# Patient Record
Sex: FEMALE | Race: BLACK OR AFRICAN AMERICAN | Employment: FULL TIME | URBAN - METROPOLITAN AREA
[De-identification: names, ages, dates, MRNs, and addresses within clinical notes are randomized per-mention and may not be internally consistent; named-entity substitution may affect disease eponyms.]

---

## 2019-10-17 ENCOUNTER — OFFICE VISIT (OUTPATIENT)
Dept: PRIMARY CARE CLINIC | Age: 24
End: 2019-10-17

## 2019-10-17 VITALS
SYSTOLIC BLOOD PRESSURE: 159 MMHG | TEMPERATURE: 99.2 F | OXYGEN SATURATION: 100 % | BODY MASS INDEX: 43.02 KG/M2 | WEIGHT: 252 LBS | RESPIRATION RATE: 18 BRPM | HEART RATE: 78 BPM | HEIGHT: 64 IN | DIASTOLIC BLOOD PRESSURE: 120 MMHG

## 2019-10-17 DIAGNOSIS — I10 ESSENTIAL HYPERTENSION: Primary | ICD-10-CM

## 2019-10-17 DIAGNOSIS — R06.83 SNORING: ICD-10-CM

## 2019-10-17 DIAGNOSIS — F51.01 PRIMARY INSOMNIA: ICD-10-CM

## 2019-10-17 DIAGNOSIS — E28.2 PCOS (POLYCYSTIC OVARIAN SYNDROME): ICD-10-CM

## 2019-10-17 DIAGNOSIS — E66.9 OBESITY (BMI 30-39.9): ICD-10-CM

## 2019-10-17 DIAGNOSIS — F41.9 ANXIETY: ICD-10-CM

## 2019-10-17 DIAGNOSIS — F32.A DEPRESSION, UNSPECIFIED DEPRESSION TYPE: ICD-10-CM

## 2019-10-17 RX ORDER — METFORMIN HYDROCHLORIDE 500 MG/1
TABLET ORAL 2 TIMES DAILY WITH MEALS
COMMUNITY

## 2019-10-17 RX ORDER — BUPROPION HYDROCHLORIDE 100 MG/1
100 TABLET ORAL DAILY
Qty: 30 TAB | Refills: 1 | Status: SHIPPED | OUTPATIENT
Start: 2019-10-17 | End: 2020-02-06 | Stop reason: ALTCHOICE

## 2019-10-17 RX ORDER — SERTRALINE HYDROCHLORIDE 50 MG/1
50 TABLET, FILM COATED ORAL DAILY
Qty: 30 TAB | Refills: 1 | Status: SHIPPED | OUTPATIENT
Start: 2019-10-17 | End: 2020-02-06 | Stop reason: ALTCHOICE

## 2019-10-17 RX ORDER — HYDROCHLOROTHIAZIDE 25 MG/1
25 TABLET ORAL DAILY
Qty: 90 TAB | Refills: 4 | Status: SHIPPED | OUTPATIENT
Start: 2019-10-17 | End: 2022-07-13 | Stop reason: SDUPTHER

## 2019-10-17 RX ORDER — LISINOPRIL 20 MG/1
TABLET ORAL DAILY
COMMUNITY
End: 2020-02-06 | Stop reason: SDUPTHER

## 2019-10-17 RX ORDER — HYDROCHLOROTHIAZIDE 12.5 MG/1
12.5 TABLET ORAL DAILY
COMMUNITY
End: 2019-10-17 | Stop reason: DRUGHIGH

## 2019-10-17 RX ORDER — TRAZODONE HYDROCHLORIDE 50 MG/1
50 TABLET ORAL
Qty: 30 TAB | Refills: 1 | Status: SHIPPED | OUTPATIENT
Start: 2019-10-17 | End: 2020-02-06 | Stop reason: ALTCHOICE

## 2019-10-17 RX ORDER — LISINOPRIL 20 MG/1
20 TABLET ORAL DAILY
Qty: 30 TAB | Refills: 3 | Status: SHIPPED | OUTPATIENT
Start: 2019-10-17 | End: 2020-08-18 | Stop reason: SDUPTHER

## 2019-10-17 RX ORDER — SERTRALINE HYDROCHLORIDE 50 MG/1
TABLET, FILM COATED ORAL DAILY
COMMUNITY
End: 2020-02-06 | Stop reason: ALTCHOICE

## 2019-10-17 NOTE — PROGRESS NOTES
Chief Complaint   Patient presents with   [address]     no other cncerns    Referral Request     therapist for meds    Medication Refill     1. Have you been to the ER, urgent care clinic since your last visit? Hospitalized since your last visit? No    2. Have you seen or consulted any other health care providers outside of the [address] [place] [address] since your last visit? Include any pap smears or colon screening.  No

## 2019-10-17 NOTE — PROGRESS NOTES
Mauro Mendes is a 30 y.o.  female and presents with     Chief Complaint   Patient presents with   [address]     no other cncerns    Referral Request     therapist for meds    Medication Refill    Hypertension    Obesity    Depression    PCOS     Pt is here to establish care,  Pt  Has h/o HTN, depression , PCOS, obesity, PTSD related to childhood mental and physical trauma. Pt sasys she used to go to [facility]  And was seeing primary and Psychiatrist and therapist there. She says it closed down 6  Months back and has not been able to get appt with Psych. She has been without meds for HTN for 6 months. She sees Obgyn for PCOS who put her on metformin but she cant tolerate the meds. Past Medical History:   Diagnosis Date    Depression     has been on meds since 1/11    GERD (gastroesophageal reflux disease)     Psychiatric disorder     Seasonal allergies      Past Surgical History:   Procedure Laterality Date    HX ADENOIDECTOMY      HX HEENT      jaw surgery    HX TONSILLECTOMY       Current Outpatient Medications   Medication Sig    metFORMIN (GLUCOPHAGE) 500 mg tablet Take  by mouth two (2) times daily (with meals).  sertraline (ZOLOFT) 50 mg tablet Take  by mouth daily.  lisinopril (PRINIVIL, ZESTRIL) 20 mg tablet Take  by mouth daily.  lisinopril (PRINIVIL, ZESTRIL) 20 mg tablet Take 1 Tab by mouth daily.  hydroCHLOROthiazide (HYDRODIURIL) 25 mg tablet Take 1 Tab by mouth daily.  traZODone (DESYREL) 50 mg tablet Take 1 Tab by mouth nightly.  buPROPion (WELLBUTRIN) 100 mg tablet Take 1 Tab by mouth daily.  sertraline (ZOLOFT) 50 mg tablet Take 1 Tab by mouth daily.  HYDROcodone-acetaminophen (NORCO) 5-325 mg per tablet Take 1-2 Tabs by mouth every four (4) hours as needed for Pain. Max Daily Amount: 12 Tabs.  ondansetron hcl (ZOFRAN, AS HYDROCHLORIDE,) 8 mg tablet Take 1 Tab by mouth every eight (8) hours as needed for Nausea.      No current facility-administered medications for this visit. Health Maintenance   Topic Date Due    HPV Age 9Y-34Y (1 - Female 3-dose series) 10/07/2008    DTaP/Tdap/Td series (1 - Tdap) 10/07/2014    PAP AKA CERVICAL CYTOLOGY  10/07/2014    Influenza Age 5 to Adult  08/01/2019    Pneumococcal 0-64 years  Aged Out       There is no immunization history on file for this patient. Patient's last menstrual period was 10/14/2019. Allergies and Intolerances: Allergies   Allergen Reactions    Witch Hazel Other (comments)       Family History:   Family History   Problem Relation Age of Onset    Hypertension Mother     Diabetes Mother     Heart Disease Mother     Diabetes Father     Hypertension Father     Heart Disease Father        Social History:   She  reports that she has been smoking. She has never used smokeless tobacco.  She  reports that she does not drink alcohol.             Review of Systems:   General: negative for - chills, fatigue, fever, weight change  Psych: negative for - anxiety, depression, irritability or mood swings  ENT: negative for - headaches, hearing change, nasal congestion, oral lesions, sneezing or sore throat  Heme/ Lymph: negative for - bleeding problems, bruising, pallor or swollen lymph nodes  Endo: negative for - hot flashes, polydipsia/polyuria or temperature intolerance  Resp: negative for - cough, shortness of breath or wheezing  CV: negative for - chest pain, edema or palpitations  GI: negative for - abdominal pain, change in bowel habits, constipation, diarrhea or nausea/vomiting  : negative for - dysuria, hematuria, incontinence, pelvic pain or vulvar/vaginal symptoms  MSK: negative for - joint pain, joint swelling or muscle pain  Neuro: negative for - confusion, headaches, seizures or weakness  Derm: negative for - dry skin, hair changes, rash or skin lesion changes          Physical:   Vitals:   Vitals:    10/17/19 1427   BP: (!) 159/120   Pulse: 78   Resp: 18 Temp: 99.2 °F (37.3 °C)   TempSrc: Oral   SpO2: 100%   Weight: 252 lb (114.3 kg)   Height: 5' 4\" (1.626 m)           Exam:   HEENT- atraumatic,normocephalic, awake, oriented, well nourished, obese , looks sad, talks soft  Neck - supple,no enlarged lymph nodes, no JVD, no thyromegaly  Chest- CTA, no rhonchi, no crackles  Heart- rrr, no murmurs / gallop/rub  Abdomen- soft,BS+,NT, no hepatosplenomegaly  Ext - no c/c/edema   Neuro- no focal deficits. Power 5/5 all extremities  Skin - warm,dry, no obvious rashes. Review of Data:   LABS:   Lab Results   Component Value Date/Time    WBC 8.2 08/24/2015 08:35 PM    HGB 11.8 08/24/2015 08:35 PM    HCT 37.9 08/24/2015 08:35 PM    PLATELET 120 32/32/7231 08:35 PM     Lab Results   Component Value Date/Time    Sodium 140 08/24/2015 08:35 PM    Potassium 3.8 08/24/2015 08:35 PM    Chloride 105 08/24/2015 08:35 PM    CO2 25 08/24/2015 08:35 PM    Glucose 115 (H) 08/24/2015 08:35 PM    BUN 10 08/24/2015 08:35 PM    Creatinine 0.66 08/24/2015 08:35 PM     No results found for: CHOL, CHOLX, CHLST, CHOLV, HDL, HDLP, LDL, LDLC, DLDLP, TGLX, TRIGL, TRIGP  No results found for: GPT        Impression / Plan:        ICD-10-CM ICD-9-CM    1. Essential hypertension I10 401.9 CBC WITH AUTOMATED DIFF      METABOLIC PANEL, COMPREHENSIVE      LIPID PANEL      TSH 3RD GENERATION      hydroCHLOROthiazide (HYDRODIURIL) 25 mg tablet   2. Depression, unspecified depression type F32.9 311 REFERRAL TO PSYCHIATRY      traZODone (DESYREL) 50 mg tablet      buPROPion (WELLBUTRIN) 100 mg tablet      sertraline (ZOLOFT) 50 mg tablet   3. Obesity (BMI 30-39. 9) E66.9 278.00    4. PCOS (polycystic ovarian syndrome) E28.2 256.4    5. Anxiety F41.9 300.00 REFERRAL TO PSYCHIATRY   6. Primary insomnia F51.01 307.42 REFERRAL TO PSYCHIATRY      traZODone (DESYREL) 50 mg tablet   7.  Snoring R06.83 786.09 SLEEP MEDICINE REFERRAL     Informed pt that I am not a psychiatrist . For now I am refilling her med on request. She needs to make appt with Psych and get refills from them in the future. Obesity - advised diet , exercise. Explained to patient risk benefits of the medications. Advised patient to stop meds if having any side effects. Pt verbalized understanding of the instructions. I have discussed the diagnosis with the patient and the intended plan as seen in the above orders. The patient has received an after-visit summary and questions were answered concerning future plans. I have discussed medication side effects and warnings with the patient as well. I have reviewed the plan of care with the patient, accepted their input and they are in agreement with the treatment goals. Reviewed plan of care. Patient has provided input and agrees with goals.         Raymond Schafer MD

## 2019-10-18 LAB
ALBUMIN SERPL-MCNC: 3.8 G/DL (ref 3.5–5.5)
ALBUMIN/GLOB SERPL: 1.1 {RATIO} (ref 1.2–2.2)
ALP SERPL-CCNC: 88 IU/L (ref 39–117)
ALT SERPL-CCNC: 9 IU/L (ref 0–32)
AST SERPL-CCNC: 16 IU/L (ref 0–40)
BASOPHILS # BLD AUTO: 0 X10E3/UL (ref 0–0.2)
BASOPHILS NFR BLD AUTO: 0 %
BILIRUB SERPL-MCNC: <0.2 MG/DL (ref 0–1.2)
BUN SERPL-MCNC: 13 MG/DL (ref 6–20)
BUN/CREAT SERPL: 21 (ref 9–23)
CALCIUM SERPL-MCNC: 9.1 MG/DL (ref 8.7–10.2)
CHLORIDE SERPL-SCNC: 105 MMOL/L (ref 96–106)
CHOLEST SERPL-MCNC: 162 MG/DL (ref 100–199)
CO2 SERPL-SCNC: 23 MMOL/L (ref 20–29)
CREAT SERPL-MCNC: 0.63 MG/DL (ref 0.57–1)
EOSINOPHIL # BLD AUTO: 0.1 X10E3/UL (ref 0–0.4)
EOSINOPHIL NFR BLD AUTO: 1 %
ERYTHROCYTE [DISTWIDTH] IN BLOOD BY AUTOMATED COUNT: 15.3 % (ref 12.3–15.4)
GLOBULIN SER CALC-MCNC: 3.4 G/DL (ref 1.5–4.5)
GLUCOSE SERPL-MCNC: 85 MG/DL (ref 65–99)
HCT VFR BLD AUTO: 30 % (ref 34–46.6)
HDLC SERPL-MCNC: 40 MG/DL
HGB BLD-MCNC: 9 G/DL (ref 11.1–15.9)
IMM GRANULOCYTES # BLD AUTO: 0 X10E3/UL (ref 0–0.1)
IMM GRANULOCYTES NFR BLD AUTO: 0 %
LDLC SERPL CALC-MCNC: 103 MG/DL (ref 0–99)
LYMPHOCYTES # BLD AUTO: 2.7 X10E3/UL (ref 0.7–3.1)
LYMPHOCYTES NFR BLD AUTO: 29 %
MCH RBC QN AUTO: 22.1 PG (ref 26.6–33)
MCHC RBC AUTO-ENTMCNC: 30 G/DL (ref 31.5–35.7)
MCV RBC AUTO: 74 FL (ref 79–97)
MONOCYTES # BLD AUTO: 0.6 X10E3/UL (ref 0.1–0.9)
MONOCYTES NFR BLD AUTO: 7 %
NEUTROPHILS # BLD AUTO: 5.7 X10E3/UL (ref 1.4–7)
NEUTROPHILS NFR BLD AUTO: 63 %
PLATELET # BLD AUTO: 394 X10E3/UL (ref 150–450)
POTASSIUM SERPL-SCNC: 3.5 MMOL/L (ref 3.5–5.2)
PROT SERPL-MCNC: 7.2 G/DL (ref 6–8.5)
RBC # BLD AUTO: 4.08 X10E6/UL (ref 3.77–5.28)
SODIUM SERPL-SCNC: 143 MMOL/L (ref 134–144)
TRIGL SERPL-MCNC: 95 MG/DL (ref 0–149)
TSH SERPL DL<=0.005 MIU/L-ACNC: 1.49 UIU/ML (ref 0.45–4.5)
VLDLC SERPL CALC-MCNC: 19 MG/DL (ref 5–40)
WBC # BLD AUTO: 9.2 X10E3/UL (ref 3.4–10.8)

## 2020-01-30 ENCOUNTER — OFFICE VISIT (OUTPATIENT)
Dept: SLEEP MEDICINE | Age: 25
End: 2020-01-30

## 2020-01-30 VITALS
RESPIRATION RATE: 19 BRPM | HEIGHT: 64 IN | HEART RATE: 76 BPM | TEMPERATURE: 97 F | WEIGHT: 260 LBS | BODY MASS INDEX: 44.39 KG/M2 | SYSTOLIC BLOOD PRESSURE: 150 MMHG | OXYGEN SATURATION: 97 % | DIASTOLIC BLOOD PRESSURE: 99 MMHG

## 2020-01-30 DIAGNOSIS — E66.01 OBESITY, MORBID (HCC): ICD-10-CM

## 2020-01-30 DIAGNOSIS — G47.33 OBSTRUCTIVE SLEEP APNEA (ADULT) (PEDIATRIC): Primary | ICD-10-CM

## 2020-01-30 DIAGNOSIS — I10 ESSENTIAL HYPERTENSION: ICD-10-CM

## 2020-01-30 NOTE — PROGRESS NOTES
[address] [facility] [place] [place], [address]  Tel.  [phone]  Fax. [address] [facility] [address]  [place], [address] [facility]  Tel.  [phone]  Fax. [phone] [address]   Tel.  [phone]  Fax. [phone]         Subjective:      Yanni Marion is an 30 y.o. female referred for evaluation for a sleep disorder. She complains of snoring associated with frequent night time awakenings and poor sleep quality. She also has difficulty falling asleep. Symptoms began several years ago, gradually worsening since that time. She usually can fall asleep in variable minutes. Family or house members note snoring. She denies falling asleep while during conversation. Yanni Marion does wake up frequently at night. She is bothered by waking up too early and left unable to get back to sleep. She actually sleeps about 4 hours at night and wakes up about 6 times during the night. She does not work shifts:  .   Jon Call indicates she does get too little sleep at night. Her bedtime is 0900. She awakens at  . She does not take naps. . She has the following observed behaviors: Loud snoring, Light snoring, Sleep talking, Twitching of legs or feet, Kicking with legs, Becoming very rigid and/or shaking; Nightmares. 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 [place] Sleepiness Score: 20   which reflect severe daytime drowsiness. Allergies   Allergen Reactions    Witch Hazel Other (comments)         Current Outpatient Medications:     metFORMIN (GLUCOPHAGE) 500 mg tablet, Take  by mouth two (2) times daily (with meals). , Disp: , Rfl:     lisinopril (PRINIVIL, ZESTRIL) 20 mg tablet, Take  by mouth daily. , Disp: , Rfl:     lisinopril (PRINIVIL, ZESTRIL) 20 mg tablet, Take 1 Tab by mouth daily. , Disp: 30 Tab, Rfl: 3    hydroCHLOROthiazide (HYDRODIURIL) 25 mg tablet, Take 1 Tab by mouth daily. , Disp: 90 Tab, Rfl: 4    HYDROcodone-acetaminophen (NORCO) 5-325 mg per tablet, Take 1-2 Tabs by mouth every four (4) hours as needed for Pain. Max Daily Amount: 12 Tabs., Disp: 20 Tab, Rfl: 0    ondansetron hcl (ZOFRAN, AS HYDROCHLORIDE,) 8 mg tablet, Take 1 Tab by mouth every eight (8) hours as needed for Nausea., Disp: 8 Tab, Rfl: 0    sertraline (ZOLOFT) 50 mg tablet, Take  by mouth daily. , Disp: , Rfl:     traZODone (DESYREL) 50 mg tablet, Take 1 Tab by mouth nightly., Disp: 30 Tab, Rfl: 1    buPROPion (WELLBUTRIN) 100 mg tablet, Take 1 Tab by mouth daily. , Disp: 30 Tab, Rfl: 1    sertraline (ZOLOFT) 50 mg tablet, Take 1 Tab by mouth daily. , Disp: 30 Tab, Rfl: 1     She  has a past medical history of Depression, GERD (gastroesophageal reflux disease), Psychiatric disorder, and Seasonal allergies. She  has a past surgical history that includes hx tonsillectomy; hx adenoidectomy; and hx heent. She family history includes Diabetes in her father and mother; Heart Disease in her father and mother; Hypertension in her father and mother. She  reports that she has been smoking. She has never used smokeless tobacco. She reports that she does not drink alcohol or use drugs. Review of Systems:  Constitutional:  +weight gain. Eyes:  No blurred vision. CVS:  No significant chest pain  Pulm:  No significant shortness of breath  GI:  No significant nausea or vomiting  :  No significant nocturia  Musculoskeletal:  No significant joint pain at night  Skin:  No significant rashes  Neuro:  No significant dizziness   Psych:  Treated for depression and anxiety    Sleep Review of Systems: notable for + difficulty falling asleep; +frequent awakenings at night;  regular dreaming noted; + nightmares ; +early morning headaches; + memory problems; no concentration issues; no history of any automobile or occupational accidents due to daytime drowsiness.       Objective:     Visit Vitals  BP (!) 150/99 (BP 1 Location: Left arm, BP Patient Position: Sitting)   Pulse 76   Temp 97 °F (36.1 °C) (Oral)   Resp 19   Ht 5' 4\" (1.626 m)   Wt 260 lb (117.9 kg)   SpO2 97%   BMI 44.63 kg/m²         General:   Not in acute distress   Eyes:  Anicteric sclerae, no obvious strabismus   Nose:  No obvious nasal septum deviation    Oropharynx:   Class 2 oropharyngeal outlet, thick tongue base,    Tonsils:   tonsils are absent   Neck:   Neck circ. in \"inches\": 15; midline trachea   Chest/Lungs:  Equal lung expansion, clear on auscultation    CVS:  Normal rate, regular rhythm; no JVD   Skin:  Warm to touch; no obvious rashes   Neuro:  No focal deficits ; no obvious tremor    Psych:  Normal affect,  normal countenance;          Assessment:       ICD-10-CM ICD-9-CM    1. Obstructive sleep apnea (adult) (pediatric) G47.33 327.23 SLEEP STUDY UNATTENDED, 4 CHANNEL   2. Obesity, morbid ([address] E66.01 278.01    3. Essential hypertension I10 401.9          Plan:     * The patient currently has a Moderate Risk for having sleep apnea. STOP-BANG score 4.  * PSG was ordered for initial evaluation. I have reviewed the different types of sleep studies. Attended sleep studies and home sleep apnea tests. Home sleep testing tests only for the presence and severity of sleep apnea. she understands that if the HSAT does not provide reliable result(such as poor data/failed HSAT recording), she may have to repeat the HSAT or come in for an attended polysomnogram.   * She was provided information on sleep apnea including coresponding risk factors and the importance of proper treatment. * Counseling was provided regarding proper sleep hygiene and safe driving. *   Treatment options for sleep apnea were reviewed. she is not against a trial of PAP if found to have significant sleep apnea.    The treatment plan was reviewed with the patient in detail and reviewed with the patient and the lead technologist. she understands that the lead technologist will be calling her  with the results and assisting with the next step in the treatment plan as outlined today during the consultation with me. All of her questions were addressed. 2. Obesity - I have counseled the patient regarding the benefits of weight loss. 3. Hypertension - she continues on her current regimen. she should see her doctor for optimization of blood pressure control. I have reviewed the relationship between hypertension as it relates to sleep-disordered breathing. Thank you for allowing us to participate in your patient's medical care. We'll keep you updated on these investigations.   Electronically signed by    Claudia Hanson MD  Diplomate in Sleep Medicine  [facility]

## 2020-01-30 NOTE — PATIENT INSTRUCTIONS
[address] [facility] [place]. [place], [address]  Tel.  [phone]  Fax. [phone] [facility]  [place], [address] [facility]  Tel.  [phone]  Fax. [phone] [address]  Tel.  [phone]  Fax. [phone]     Sleep Apnea: After Your Visit  Your Care Instructions  Sleep apnea occurs when you frequently stop breathing for 10 seconds or longer during sleep. It can be mild to severe, based on the number of times per hour that you stop breathing or have slowed breathing. Blocked or narrowed airways in your nose, mouth, or throat can cause sleep apnea. Your airway can become blocked when your throat muscles and tongue relax during sleep. Sleep apnea is common, occurring in 1 out of 20 individuals. Individuals having any of the following characteristics should be evaluated and treated right away due to high risk and detrimental consequences from untreated sleep apnea:  1. Obesity  2. Congestive Heart failure  3. Atrial Fibrillation  4. Uncontrolled Hypertension  5. Type II Diabetes  6. Night-time Arrhythmias  7. Stroke  8. Pulmonary Hypertension  9. High-risk Driving Populations (pilots, truck drivers, etc.)  10. Patients Considering Weight-loss Surgery    How do you know you have sleep apnea? You probably have sleep apnea if you answer 'yes' to 3 or more of the following questions:  S - Have you been told that you Snore? T - Are you often Tired during the day? O - Has anyone Observed you stop breathing while sleeping? P- Do you have (or are being treated for) high blood Pressure? B - Are you obese (Body Mass Index > 35)? A - Is your Age 48years old or older? N - Is your Neck size greater than 16 inches? G - Are you male Gender? A sleep physician can prescribe a breathing device that prevents tissues in the throat from blocking your airway.  Or your doctor may recommend using a dental device (oral breathing device) to help keep your airway open. In some cases, surgery may be needed to remove enlarged tissues in the throat. Follow-up care is a key part of your treatment and safety. Be sure to make and go to all appointments, and call your doctor if you are having problems. It's also a good idea to know your test results and keep a list of the medicines you take. How can you care for yourself at home? · Lose weight, if needed. It may reduce the number of times you stop breathing or have slowed breathing. · Go to bed at the same time every night. · Sleep on your side. It may stop mild apnea. If you tend to roll onto your back, sew a pocket in the back of your pajama top. Put a tennis ball into the pocket, and stitch the pocket shut. This will help keep you from sleeping on your back. · Avoid alcohol and medicines such as sleeping pills and sedatives before bed. · Do not smoke. Smoking can make sleep apnea worse. If you need help quitting, talk to your doctor about stop-smoking programs and medicines. These can increase your chances of quitting for good. · Prop up the head of your bed 4 to 6 inches by putting bricks under the legs of the bed. · Treat breathing problems, such as a stuffy nose, caused by a cold or allergies. · Use a continuous positive airway pressure (CPAP) breathing machine if lifestyle changes do not help your apnea and your doctor recommends it. The machine keeps your airway from closing when you sleep. · If CPAP does not help you, ask your doctor whether you should try other breathing machines. A bilevel positive airway pressure machine has two types of air pressureâone for breathing in and one for breathing out. Another device raises or lowers air pressure as needed while you breathe. · If your nose feels dry or bleeds when using one of these machines, talk with your doctor about increasing moisture in the air. A humidifier may help.   · If your nose is runny or stuffy from using a breathing machine, talk with your doctor about using decongestants or a corticosteroid nasal spray. When should you call for help? Watch closely for changes in your health, and be sure to contact your doctor if:  · You still have sleep apnea even though you have made lifestyle changes. · You are thinking of trying a device such as CPAP. · You are having problems using a CPAP or similar machine. Where can you learn more? Go to [employer]. Enter J731 in the search box to learn more about \"Sleep Apnea: After Your Visit. \"   © 6168-2827 Healthwise, [employer]. Care instructions adapted under license by Nicolas Harper (which disclaims liability or warranty for this information). This care instruction is for use with your licensed healthcare professional. If you have questions about a medical condition or this instruction, always ask your healthcare professional. Arabella Eaglel any warranty or liability for your use of this information. PROPER SLEEP HYGIENE    What to avoid  · Do not have drinks with caffeine, such as coffee or black tea, for 8 hours before bed. · Do not smoke or use other types of tobacco near bedtime. Nicotine is a stimulant and can keep you awake. · Avoid drinking alcohol late in the evening, because it can cause you to wake in the middle of the night. · Do not eat a big meal close to bedtime. If you are hungry, eat a light snack. · Do not drink a lot of water close to bedtime, because the need to urinate may wake you up during the night. · Do not read or watch TV in bed. Use the bed only for sleeping and sexual activity. What to try  · Go to bed at the same time every night, and wake up at the same time every morning. Do not take naps during the day. · Keep your bedroom quiet, dark, and cool. · Get regular exercise, but not within 3 to 4 hours of your bedtime. .  · Sleep on a comfortable pillow and mattress.   · If watching the clock makes you anxious, turn it facing away from you so you cannot see the time. · If you worry when you lie down, start a worry book. Well before bedtime, write down your worries, and then set the book and your concerns aside. · Try meditation or other relaxation techniques before you go to bed. · If you cannot fall asleep, get up and go to another room until you feel sleepy. Do something relaxing. Repeat your bedtime routine before you go to bed again. · Make your house quiet and calm about an hour before bedtime. Turn down the lights, turn off the TV, log off the computer, and turn down the volume on music. This can help you relax after a busy day. Drowsy Driving  The [address] [place] [address] Road Traffic Safety Administration cites drowsiness as a causing factor in more than 748,940 police reported crashes annually, resulting in 76,000 injuries and 1,500 deaths. Other surveys suggest 55% of people polled have driven while drowsy in the past year, 23% had fallen asleep but not crashed, 3% crashed, and 2% had and accident due to drowsy driving. Who is at risk? Young Drivers: One study of drowsy driving accidents states that 55% of the drivers were under 25 years. Of those, 75% were male. Shift Workers and Travelers: People who work overnight or travel across time zones frequently are at higher risk of experiencing Circadian Rhythm Disorders. They are trying to work and function when their body is programed to sleep. Sleep Deprived: Lack of sleep has a serious impact on your ability to pay attention or focus on a task. Consistently getting less than the average of 8 hours your body needs creates partial or cumulative sleep deprivation. Untreated Sleep Disorders: Sleep Apnea, Narcolepsy, R.L.S., and other sleep disorders (untreated) prevent a person from getting enough restful sleep. This leads to excessive daytime sleepiness and increases the risk for drowsy driving accidents by up to 7 times.   Medications / Alcohol: Even over the counter medications can cause drowsiness. Medications that impair a drivers attention should have a warning label. Alcohol naturally makes you sleepy and on its own can cause accidents. Combined with excessive drowsiness its effects are amplified. Signs of Drowsy Driving:   * You don't remember driving the last few miles   * You may drift out of your ebony   * You are unable to focus and your thoughts wander   * You may yawn more often than normal   * You have difficulty keeping your eyes open / nodding off   * Missing traffic signs, speeding, or tailgating  Prevention-   Good sleep hygiene, lifestyle and behavioral choices have the most impact on drowsy driving. There is no substitute for sleep and the average person requires 8 hours nightly. If you find yourself driving drowsy, stop and sleep. Consider the sleep hygiene tips provided during your visit as well. Medication Refill Policy: Refills for all medications require 1 week advance notice. Please have your pharmacy fax a refill request. We are unable to fax, or call in \"controled substance\" medications and you will need to pick these prescriptions up from our office. [employer] Activation    Thank you for requesting access to [employer]. Please follow the instructions below to securely access and download your online medical record. [employer] allows you to send messages to your doctor, view your test results, renew your prescriptions, schedule appointments, and more. How Do I Sign Up? 1. In your internet browser, go to [email or web address]. 2. Click on the First Time User? Click Here link in the Sign In box. You will see the New Member Sign Up page. 3. Enter your [employer] Access Code exactly as it appears below. You will not need to use this code after youve completed the sign-up process. If you do not sign up before the expiration date, you must request a new code. [employer] Access Code:  Activation code not generated  Current [employer] Status: Active (This is the date your [employer] access code will )    4. Enter the last four digits of your Social Security Number (xxxx) and Date of Birth (mm/dd/yyyy) as indicated and click Submit. You will be taken to the next sign-up page. 5. Create a [employer] ID. This will be your [employer] login ID and cannot be changed, so think of one that is secure and easy to remember. 6. Create a [employer] password. You can change your password at any time. 7. Enter your Password Reset Question and Answer. This can be used at a later time if you forget your password. 8. Enter your e-mail address. You will receive e-mail notification when new information is available in [address] 9. Click Sign Up. You can now view and download portions of your medical record. 10. Click the Download Summary menu link to download a portable copy of your medical information. Additional Information    If you have questions, please call [phone]. Remember, [employer] is NOT to be used for urgent needs. For medical emergencies, dial 911.

## 2020-01-31 ENCOUNTER — HOSPITAL ENCOUNTER (OUTPATIENT)
Dept: SLEEP MEDICINE | Age: 25
Discharge: HOME | End: 2020-01-31
Payer: MEDICAID

## 2020-01-31 PROCEDURE — 95806 SLEEP STUDY UNATT&RESP EFFT: CPT | Performed by: INTERNAL MEDICINE

## 2020-02-05 ENCOUNTER — TELEPHONE (OUTPATIENT)
Dept: SLEEP MEDICINE | Age: 25
End: 2020-02-05

## 2020-02-05 NOTE — TELEPHONE ENCOUNTER
Failed HSAT. Repeat HSAT.  Short recording time and loose sensor on finger (oxygen signal)  Inform patient and schedule

## 2020-02-06 ENCOUNTER — OFFICE VISIT (OUTPATIENT)
Dept: PRIMARY CARE CLINIC | Age: 25
End: 2020-02-06

## 2020-02-06 VITALS
HEIGHT: 64 IN | OXYGEN SATURATION: 98 % | HEART RATE: 95 BPM | TEMPERATURE: 98.2 F | RESPIRATION RATE: 20 BRPM | DIASTOLIC BLOOD PRESSURE: 100 MMHG | SYSTOLIC BLOOD PRESSURE: 146 MMHG | BODY MASS INDEX: 44.01 KG/M2 | WEIGHT: 257.8 LBS

## 2020-02-06 DIAGNOSIS — M54.9 BACK PAIN, UNSPECIFIED BACK LOCATION, UNSPECIFIED BACK PAIN LATERALITY, UNSPECIFIED CHRONICITY: ICD-10-CM

## 2020-02-06 DIAGNOSIS — R06.83 SNORING: ICD-10-CM

## 2020-02-06 DIAGNOSIS — I10 ESSENTIAL HYPERTENSION: ICD-10-CM

## 2020-02-06 DIAGNOSIS — Z87.442 HISTORY OF KIDNEY STONES: ICD-10-CM

## 2020-02-06 DIAGNOSIS — E66.9 OBESITY (BMI 30-39.9): ICD-10-CM

## 2020-02-06 DIAGNOSIS — E28.2 PCOS (POLYCYSTIC OVARIAN SYNDROME): ICD-10-CM

## 2020-02-06 DIAGNOSIS — D64.9 ANEMIA, UNSPECIFIED TYPE: ICD-10-CM

## 2020-02-06 DIAGNOSIS — R31.9 HEMATURIA, UNSPECIFIED TYPE: ICD-10-CM

## 2020-02-06 DIAGNOSIS — F32.A DEPRESSION, UNSPECIFIED DEPRESSION TYPE: ICD-10-CM

## 2020-02-06 DIAGNOSIS — R10.9 ABDOMINAL PAIN, UNSPECIFIED ABDOMINAL LOCATION: ICD-10-CM

## 2020-02-06 DIAGNOSIS — R30.0 DYSURIA: Primary | ICD-10-CM

## 2020-02-06 LAB
BILIRUB UR QL STRIP: NEGATIVE
GLUCOSE UR-MCNC: NEGATIVE MG/DL
HGB BLD-MCNC: 11.3 G/DL
KETONES P FAST UR STRIP-MCNC: NEGATIVE MG/DL
PH UR STRIP: 7 [PH] (ref 4.6–8)
PROT UR QL STRIP: NEGATIVE
SP GR UR STRIP: 1.02 (ref 1–1.03)
UA UROBILINOGEN AMB POC: NORMAL (ref 0.2–1)
URINALYSIS CLARITY POC: CLEAR
URINALYSIS COLOR POC: YELLOW
URINE BLOOD POC: NORMAL
URINE LEUKOCYTES POC: NEGATIVE
URINE NITRITES POC: NEGATIVE

## 2020-02-06 RX ORDER — VENLAFAXINE 37.5 MG/1
37.5 TABLET ORAL DAILY
COMMUNITY
End: 2020-08-18

## 2020-02-06 RX ORDER — PRAZOSIN HYDROCHLORIDE 2 MG/1
2 CAPSULE ORAL
COMMUNITY

## 2020-02-06 RX ORDER — LISINOPRIL 20 MG/1
20 TABLET ORAL 2 TIMES DAILY
Qty: 60 TAB | Refills: 3 | Status: SHIPPED | OUTPATIENT
Start: 2020-02-06 | End: 2021-05-10

## 2020-02-06 NOTE — PROGRESS NOTES
Chief Complaint   Patient presents with    Blood Pressure Check    Medication Evaluation     check dose on BP medications         1. Have you been to the ER, urgent care clinic since your last visit? Hospitalized since your last visit? No    2. Have you seen or consulted any other health care providers outside of the [address] [place] [address] since your last visit? Include any pap smears or colon screening.  No

## 2020-02-06 NOTE — PROGRESS NOTES
Aishwarya Stokes is a 30 y.o.  female and presents with     Chief Complaint   Patient presents with    Blood Pressure Check    Medication Evaluation     check dose on BP medications    Anemia    Abdominal Pain    Snoring    Depression       Pt is here for follow up. She says her blood pressure is elevated. She has been having vaginal bleeding for 6 months. She saw Obgyn and had polyps removed from uterus. However she says   She still has abdominal pain. She also has back pain. She had kidney stones in the past but says no one told her when CT was done 5 years back/  She feels tired. She sees Psych and is on new meds. She sleeps during the day and cannot sleep at night. She snores and had  Sleep study done yesterday. Past Medical History:   Diagnosis Date    Depression     has been on meds since 1/11    GERD (gastroesophageal reflux disease)     Psychiatric disorder     Seasonal allergies      Past Surgical History:   Procedure Laterality Date    HX ADENOIDECTOMY      HX HEENT      jaw surgery    HX TONSILLECTOMY       Current Outpatient Medications   Medication Sig    prazosin (MINIPRESS) 2 mg capsule Take 2 mg by mouth nightly.  venlafaxine (EFFEXOR) 37.5 mg tablet Take 37.5 mg by mouth daily.  lisinopril (PRINIVIL, ZESTRIL) 20 mg tablet Take 1 Tab by mouth two (2) times a day.  metFORMIN (GLUCOPHAGE) 500 mg tablet Take  by mouth two (2) times daily (with meals).  lisinopril (PRINIVIL, ZESTRIL) 20 mg tablet Take 1 Tab by mouth daily.  hydroCHLOROthiazide (HYDRODIURIL) 25 mg tablet Take 1 Tab by mouth daily. No current facility-administered medications for this visit.       Health Maintenance   Topic Date Due    Pneumococcal 0-64 years (1 of 1 - PPSV23) 10/07/1999    HPV Age 9Y-34Y (1 - Female 2-dose series) 10/07/2004    DTaP/Tdap/Td series (1 - Tdap) 10/07/2004    PAP AKA CERVICAL CYTOLOGY  10/07/2014    Influenza Age 9 to Adult  08/01/2019       There is no immunization history on file for this patient. No LMP recorded (lmp unknown). Allergies and Intolerances: Allergies   Allergen Reactions    Witch Hazel Other (comments)       Family History:   Family History   Problem Relation Age of Onset    Hypertension Mother     Diabetes Mother     Heart Disease Mother     Diabetes Father     Hypertension Father     Heart Disease Father        Social History:   She  reports that she has been smoking. She has never used smokeless tobacco.  She  reports no history of alcohol use.             Review of Systems:   General: negative for - chills, fatigue, fever, weight change  Psych: negative for - anxiety, depression, irritability or mood swings  ENT: negative for - headaches, hearing change, nasal congestion, oral lesions, sneezing or sore throat  Heme/ Lymph: negative for - bleeding problems, bruising, pallor or swollen lymph nodes  Endo: negative for - hot flashes, polydipsia/polyuria or temperature intolerance  Resp: negative for - cough, shortness of breath or wheezing  CV: negative for - chest pain, edema or palpitations  GI: negative for - abdominal pain, change in bowel habits, constipation, diarrhea or nausea/vomiting  : negative for - dysuria, hematuria, incontinence, pelvic pain or vulvar/vaginal symptoms  MSK: negative for - joint pain, joint swelling or muscle pain  Neuro: negative for - confusion, headaches, seizures or weakness  Derm: negative for - dry skin, hair changes, rash or skin lesion changes          Physical:   Vitals:   Vitals:    02/06/20 1319 02/06/20 1416   BP: (!) 151/113 (!) 146/100   Pulse: 95    Resp: 20    Temp: 98.2 °F (36.8 °C)    TempSrc: Oral    SpO2: 98%    Weight: 257 lb 12.8 oz (116.9 kg)    Height: 5' 4\" (1.626 m)            Exam:   HEENT- atraumatic,normocephalic, awake, oriented, well nourished, seems to be in pain and also slightly sleepy  Neck - supple,no enlarged lymph nodes, no JVD, no thyromegaly  Chest- CTA, no rhonchi, no crackles  Heart- rrr, no murmurs / gallop/rub  Abdomen- soft,BS+,NT, no hepatosplenomegaly,mild abdominal tenderness diffuse  Ext - no c/c/edema   Neuro- no focal deficits. Power 5/5 all extremities  Skin - warm,dry, no obvious rashes. Review of Data:   LABS:   Lab Results   Component Value Date/Time    WBC 9.2 10/17/2019 03:24 PM    HGB 9.0 (L) 10/17/2019 03:24 PM    HCT 30.0 (L) 10/17/2019 03:24 PM    PLATELET 383 24/15/1247 03:24 PM     Lab Results   Component Value Date/Time    Sodium 143 10/17/2019 03:24 PM    Potassium 3.5 10/17/2019 03:24 PM    Chloride 105 10/17/2019 03:24 PM    CO2 23 10/17/2019 03:24 PM    Glucose 85 10/17/2019 03:24 PM    BUN 13 10/17/2019 03:24 PM    Creatinine 0.63 10/17/2019 03:24 PM     Lab Results   Component Value Date/Time    Cholesterol, total 162 10/17/2019 03:24 PM    HDL Cholesterol 40 10/17/2019 03:24 PM    LDL, calculated 103 (H) 10/17/2019 03:24 PM    Triglyceride 95 10/17/2019 03:24 PM     No results found for: GPT        Impression / Plan:        ICD-10-CM ICD-9-CM    1. Dysuria R30.0 788.1 AMB POC URINALYSIS DIP STICK AUTO W/O MICRO   2. Depression, unspecified depression type F32.9 311    3. Essential hypertension I10 401.9 lisinopril (PRINIVIL, ZESTRIL) 20 mg tablet   4. Obesity (BMI 30-39. 9) E66.9 278.00    5. PCOS (polycystic ovarian syndrome) E28.2 256.4    6. Snoring R06.83 786.09    7. Anemia, unspecified type D64.9 285.9 AMB POC HEMOGLOBIN (HGB)   8. History of kidney stones Z87.442 V13.01 CT ABD W CONT   9. Hematuria, unspecified type R31.9 599.70 CT ABD W CONT   10. Abdominal pain, unspecified abdominal location R10.9 789.00 CT ABD W CONT   11. Back pain, unspecified back location, unspecified back pain laterality, unspecified chronicity M54.9 724.5 CT ABD W CONT       HTN -  increase lisinopril to bid, low salt diet. Anemia - improved    Uterine polyp , vaginal bleeding - follow up with Obgyn.         Explained to patient risk benefits of the medications. Advised patient to stop meds if having any side effects. Pt verbalized understanding of the instructions. I have discussed the diagnosis with the patient and the intended plan as seen in the above orders. The patient has received an after-visit summary and questions were answered concerning future plans. I have discussed medication side effects and warnings with the patient as well. I have reviewed the plan of care with the patient, accepted their input and they are in agreement with the treatment goals. Reviewed plan of care. Patient has provided input and agrees with goals. Follow-up and Dispositions    · Return in about 3 months (around 5/6/2020).          Priscila Andrea MD

## 2020-02-10 ENCOUNTER — HOSPITAL ENCOUNTER (OUTPATIENT)
Dept: CT IMAGING | Age: 25
Discharge: HOME | End: 2020-02-10
Attending: INTERNAL MEDICINE
Payer: MEDICAID

## 2020-02-10 ENCOUNTER — TELEPHONE (OUTPATIENT)
Dept: PRIMARY CARE CLINIC | Age: 25
End: 2020-02-10

## 2020-02-10 DIAGNOSIS — M54.9 BACK PAIN, UNSPECIFIED BACK LOCATION, UNSPECIFIED BACK PAIN LATERALITY, UNSPECIFIED CHRONICITY: ICD-10-CM

## 2020-02-10 DIAGNOSIS — R10.9 ABDOMINAL PAIN, UNSPECIFIED ABDOMINAL LOCATION: Primary | ICD-10-CM

## 2020-02-10 DIAGNOSIS — E28.2 PCOS (POLYCYSTIC OVARIAN SYNDROME): ICD-10-CM

## 2020-02-10 DIAGNOSIS — Z87.442 HISTORY OF KIDNEY STONES: ICD-10-CM

## 2020-02-10 DIAGNOSIS — R31.9 HEMATURIA, UNSPECIFIED TYPE: ICD-10-CM

## 2020-02-10 DIAGNOSIS — R10.9 ABDOMINAL PAIN, UNSPECIFIED ABDOMINAL LOCATION: ICD-10-CM

## 2020-02-10 PROCEDURE — 74176 CT ABD & PELVIS W/O CONTRAST: CPT

## 2020-02-10 RX ORDER — SODIUM CHLORIDE 0.9 % (FLUSH) 0.9 %
10 SYRINGE (ML) INJECTION
Status: DISCONTINUED | OUTPATIENT
Start: 2020-02-10 | End: 2020-02-10 | Stop reason: ALTCHOICE

## 2020-02-10 NOTE — TELEPHONE ENCOUNTER
Cornelius Mcduffie from [facility] scan is calling and needs a new order sent over for an abdomin/pelvic CAT scan asap

## 2020-02-11 ENCOUNTER — TELEPHONE (OUTPATIENT)
Dept: PRIMARY CARE CLINIC | Age: 25
End: 2020-02-11

## 2020-02-11 DIAGNOSIS — N20.0 KIDNEY STONES: Primary | ICD-10-CM

## 2020-02-11 NOTE — TELEPHONE ENCOUNTER
Patient has requested a copy of CT results to be sent over to [facility] GYN office, Dr. Keysha Womack. [phone] fax number. Copy has been faxed with confirmation.

## 2020-02-11 NOTE — TELEPHONE ENCOUNTER
Message has been conveyed to patient per Dr. Judah Zayas. Patient verbalized understanding and no further questions were asked.

## 2020-02-11 NOTE — TELEPHONE ENCOUNTER
Pt called and would like test results/cat scan sent to her other Dr.    Dr. Priscilla Wesley  Fax: [phone]

## 2020-02-11 NOTE — TELEPHONE ENCOUNTER
Patient has attempted to provide two fax numbers for Dr. Alfredito Sims. Error confirmation came with both fax numbers.  Patient has decided to have paper work mailed to home address

## 2020-02-11 NOTE — TELEPHONE ENCOUNTER
Pt called back to inform the provider that the urologist referred by him is out of network. She was asking to know which urologist is in network with her insurance. And informed the pt to call her insurance and find out who is in network with her insurance and inform the name of the doctor, address and fax number so that the reference can be faxed over.

## 2020-02-11 NOTE — TELEPHONE ENCOUNTER
----- Message from Julisa Jimenez MD sent at 2/11/2020  9:02 AM EST -----  Ct scan shows several kidney stones. I am referring pt to Urology.

## 2020-02-13 ENCOUNTER — TELEPHONE (OUTPATIENT)
Dept: PRIMARY CARE CLINIC | Age: 25
End: 2020-02-13

## 2020-02-13 NOTE — TELEPHONE ENCOUNTER
Last office visit and last labs have been faxed to Beula Na with Urology as requested with fax confirmation

## 2020-02-13 NOTE — TELEPHONE ENCOUNTER
Claudio Osorio from Urology says she has an appointment today and they have yet to receive any notes or labs from us and they need them before her appt at 10am.    Please call back or fax information.     Fax:[phone]

## 2020-08-18 ENCOUNTER — VIRTUAL VISIT (OUTPATIENT)
Dept: PRIMARY CARE CLINIC | Age: 25
End: 2020-08-18
Payer: MEDICAID

## 2020-08-18 DIAGNOSIS — I10 ESSENTIAL HYPERTENSION: ICD-10-CM

## 2020-08-18 DIAGNOSIS — R06.83 SNORING: ICD-10-CM

## 2020-08-18 DIAGNOSIS — G43.009 MIGRAINE WITHOUT AURA AND WITHOUT STATUS MIGRAINOSUS, NOT INTRACTABLE: ICD-10-CM

## 2020-08-18 DIAGNOSIS — F32.A DEPRESSION, UNSPECIFIED DEPRESSION TYPE: Primary | ICD-10-CM

## 2020-08-18 DIAGNOSIS — F41.9 ANXIETY: ICD-10-CM

## 2020-08-18 PROCEDURE — 99213 OFFICE O/P EST LOW 20 MIN: CPT | Performed by: INTERNAL MEDICINE

## 2020-08-18 RX ORDER — LISINOPRIL 20 MG/1
20 TABLET ORAL 2 TIMES DAILY
Qty: 60 TAB | Refills: 3 | Status: SHIPPED | OUTPATIENT
Start: 2020-08-18 | End: 2021-01-20 | Stop reason: SDUPTHER

## 2020-08-18 RX ORDER — LORAZEPAM 0.5 MG/1
0.5 TABLET ORAL
Qty: 20 TAB | Refills: 0 | Status: SHIPPED | OUTPATIENT
Start: 2020-08-18 | End: 2022-07-13

## 2020-08-18 RX ORDER — PROPRANOLOL HYDROCHLORIDE 20 MG/1
20 TABLET ORAL 2 TIMES DAILY
Qty: 60 TAB | Refills: 3 | Status: SHIPPED | OUTPATIENT
Start: 2020-08-18 | End: 2021-01-11

## 2020-08-18 RX ORDER — SERTRALINE HYDROCHLORIDE 50 MG/1
50 TABLET, FILM COATED ORAL DAILY
Qty: 30 TAB | Refills: 3 | Status: SHIPPED | OUTPATIENT
Start: 2020-08-18 | End: 2021-01-11

## 2020-08-18 NOTE — PROGRESS NOTES
Maribell Mcgowan is a 30 y.o. female who was seen by synchronous (real-time) audio-video technology on 8/18/2020 for Hypertension        Assessment & Plan:   Diagnoses and all orders for this visit:    1. Depression, unspecified depression type    2. Essential hypertension  -     lisinopriL (PRINIVIL, ZESTRIL) 20 mg tablet; Take 1 Tab by mouth two (2) times a day. -     propranoloL (INDERAL) 20 mg tablet; Take 1 Tab by mouth two (2) times a day. 3. Anxiety  -     sertraline (ZOLOFT) 50 mg tablet; Take 1 Tab by mouth daily.  -     LORazepam (ATIVAN) 0.5 mg tablet; Take 1 Tab by mouth nightly as needed for Anxiety. Max Daily Amount: 0.5 mg.    4. Snoring  -     SLEEP MEDICINE REFERRAL    5. Migraine without aura and without status migrainosus, not intractable  -     REFERRAL TO NEUROLOGY  -     propranoloL (INDERAL) 20 mg tablet; Take 1 Tab by mouth two (2) times a day. Information patient that we will try Inderal that might help with both high blood pressure as well as migraine headaches. Asked patient to make appointment with psychiatrist    I spent at least 15 minutes on this visit with this established patient. Subjective:       Prior to Admission medications    Medication Sig Start Date End Date Taking? Authorizing Provider   prazosin (MINIPRESS) 2 mg capsule Take 2 mg by mouth nightly. Provider, Historical   venlafaxine (EFFEXOR) 37.5 mg tablet Take 37.5 mg by mouth daily. Provider, Historical   lisinopril (PRINIVIL, ZESTRIL) 20 mg tablet Take 1 Tab by mouth two (2) times a day. 2/6/20   Stephan Bob MD   metFORMIN (GLUCOPHAGE) 500 mg tablet Take  by mouth two (2) times daily (with meals). Provider, Historical   lisinopril (PRINIVIL, ZESTRIL) 20 mg tablet Take 1 Tab by mouth daily. 10/17/19   Stephan Bob MD   hydroCHLOROthiazide (HYDRODIURIL) 25 mg tablet Take 1 Tab by mouth daily. 10/17/19   Stephan Bob MD     History    Pt was in hospital for panic attack.   Pt was at [facility]. Pt sees Psych[facility]ard [facility] at [facility]. Pt says that the medicines that was given to her by Psych are not working  Pt was given ativan in the hospital.  Pt had surgery in February for cyst on he ovaries. She reports that she is taking her blood pressure medications and needs refills. She does snore and has not had a sleep study. She also reports of migraine headaches. ROS    Objective:   No flowsheet data found. [INSTRUCTIONS:  \"[x]\" Indicates a positive item  \"[]\" Indicates a negative item  -- DELETE ALL ITEMS NOT EXAMINED]    Constitutional: [x] Appears well-developed and well-nourished [x] No apparent distress      [] Abnormal -     Mental status: [x] Alert and awake  [x] Oriented to person/place/time [x] Able to follow commands    [] Abnormal -     Eyes:   EOM    [x]  Normal    [] Abnormal -   Sclera  [x]  Normal    [] Abnormal -          Discharge [x]  None visible   [] Abnormal -     HENT: [x] Normocephalic, atraumatic  [] Abnormal -   [x] Mouth/Throat: Mucous membranes are moist    External Ears [x] Normal  [] Abnormal -    Neck: [x] No visualized mass [] Abnormal -     Pulmonary/Chest: [x] Respiratory effort normal   [x] No visualized signs of difficulty breathing or respiratory distress        [] Abnormal -      Musculoskeletal:   [x] Normal gait with no signs of ataxia         [x] Normal range of motion of neck        [] Abnormal -     Neurological:        [x] No Facial Asymmetry (Cranial nerve 7 motor function) (limited exam due to video visit)          [x] No gaze palsy        [] Abnormal -          Skin:        [x] No significant exanthematous lesions or discoloration noted on facial skin         [] Abnormal -            Psychiatric:       [x] Normal Affect [] Abnormal -        [x] No Hallucinations    Other pertinent observable physical exam findings:-        We discussed the expected course, resolution and complications of the diagnosis(es) in detail.   Medication risks, benefits, costs, interactions, and alternatives were discussed as indicated. I advised her to contact the office if her condition worsens, changes or fails to improve as anticipated. She expressed understanding with the diagnosis(es) and plan. Rudy Fernández, who was evaluated through a patient-initiated, synchronous (real-time) audio-video encounter, and/or her healthcare decision maker, is aware that it is a billable service, with coverage as determined by her insurance carrier. She provided verbal consent to proceed: Yes, and patient identification was verified. It was conducted pursuant to the emergency declaration under the [address] [place] [address], [address] [place] [address] authority and the [employer] Act. A caregiver was present when appropriate. Ability to conduct physical exam was limited. I was at home. The patient was at home.       Miriam Rodríguez MD

## 2020-08-27 ENCOUNTER — DOCUMENTATION ONLY (OUTPATIENT)
Dept: NEUROLOGY | Facility: CLINIC | Age: 25
End: 2020-08-27

## 2020-08-27 ENCOUNTER — OFFICE VISIT (OUTPATIENT)
Dept: NEUROLOGY | Facility: CLINIC | Age: 25
End: 2020-08-27
Payer: MEDICAID

## 2020-08-27 VITALS
DIASTOLIC BLOOD PRESSURE: 90 MMHG | BODY MASS INDEX: 43.87 KG/M2 | SYSTOLIC BLOOD PRESSURE: 150 MMHG | HEART RATE: 74 BPM | RESPIRATION RATE: 16 BRPM | WEIGHT: 257 LBS | HEIGHT: 64 IN | OXYGEN SATURATION: 98 %

## 2020-08-27 DIAGNOSIS — T39.95XA ANALGESIC OVERUSE HEADACHE: ICD-10-CM

## 2020-08-27 DIAGNOSIS — G43.709 CHRONIC MIGRAINE W/O AURA W/O STATUS MIGRAINOSUS, NOT INTRACTABLE: Primary | ICD-10-CM

## 2020-08-27 DIAGNOSIS — G44.40 ANALGESIC OVERUSE HEADACHE: ICD-10-CM

## 2020-08-27 PROCEDURE — 99204 OFFICE O/P NEW MOD 45 MIN: CPT | Performed by: PSYCHIATRY & NEUROLOGY

## 2020-08-27 RX ORDER — SUMATRIPTAN 50 MG/1
50 TABLET, FILM COATED ORAL
Qty: 9 TAB | Refills: 1 | Status: SHIPPED | OUTPATIENT
Start: 2020-08-27 | End: 2020-08-27

## 2020-08-27 RX ORDER — DEXAMETHASONE 1 MG/1
TABLET ORAL
Qty: 18 TAB | Refills: 0 | Status: SHIPPED | OUTPATIENT
Start: 2020-08-27 | End: 2022-07-13

## 2020-08-27 RX ORDER — ERENUMAB-AOOE 140 MG/ML
140 INJECTION, SOLUTION SUBCUTANEOUS ONCE
Qty: 1 EACH | Refills: 0 | Status: SHIPPED | COMMUNITY
Start: 2020-08-27 | End: 2020-08-27

## 2020-08-27 RX ORDER — OXYCODONE AND ACETAMINOPHEN 5; 325 MG/1; MG/1
TABLET ORAL
COMMUNITY
End: 2022-07-13

## 2020-08-27 RX ORDER — ERENUMAB-AOOE 70 MG/ML
70 INJECTION SUBCUTANEOUS
Qty: 1 EACH | Refills: 3 | Status: SHIPPED | OUTPATIENT
Start: 2020-08-27 | End: 2020-08-28 | Stop reason: ALTCHOICE

## 2020-08-27 RX ORDER — VENLAFAXINE 25 MG/1
TABLET ORAL
COMMUNITY
End: 2021-01-11

## 2020-08-27 NOTE — PATIENT INSTRUCTIONS
PRESCRIPTION REFILL POLICY [facility] Statement to Patients April 1, 2014 In an effort to ensure the large volume of patient prescription refills is processed in the most efficient and expeditious manner, we are asking our patients to assist us by calling your Pharmacy for all prescription refills, this will include also your  Mail Order Pharmacy. The pharmacy will contact our office electronically to continue the refill process. Please do not wait until the last minute to call your pharmacy. We need at least 48 hours (2days) to fill prescriptions. We also encourage you to call your pharmacy before going to  your prescription to make sure it is ready. With regard to controlled substance prescription refill requests (narcotic refills) that need to be picked up at our office, we ask your cooperation by providing us with at least 72 hours (3days) notice that you will need a refill. We will not refill narcotic prescription refill requests after 4:00pm on any weekday, Monday through Thursday, or after 2:00pm on Fridays, or on the weekends. We encourage everyone to explore another way of getting your prescription refill request processed using [employer], our patient web portal through our electronic medical record system. [employer] is an efficient and effective way to communicate your medication request directly to the office and  downloadable as an daryl on your smart phone . [employer] also features a review functionality that allows you to view your medication list as well as leave messages for your physician. Are you ready to get connected? If so please review the attatched instructions or speak to any of our staff to get you set up right away! Thank you so much for your cooperation. Should you have any questions please contact our Practice Administrator. The Physicians and Staff,  [facility]

## 2020-08-27 NOTE — PROGRESS NOTES
Ms. Smith [facility] presents as a new patient for evaluation of migraines. Patient reported a daily migraine for the past several years. Depression screening done on patient.

## 2020-08-27 NOTE — PROGRESS NOTES
Chief Complaint   Patient presents with    Migraine         HISTORY OF PRESENT ILLNESS  Rocio Francis is a 30 y.o. female who came in for neurological consultation requested by Dr. Jayda Whitley. She has had headaches since 4years of age. She continues to get them very frequently and has a headache almost daily. She describes different types of headaches. She always keeps a mild dull headache in the background. At times it will become pulsating or throbbing, in the vertex region associated with light sensitivity and noise sensitivity. Occasionally she will feel nauseous. She would prefer to go into a dark room when this happens. She has been using ibuprofen almost daily for the past several months. She cannot think of any triggers. There are no other focal motor or sensory deficits associated with the headaches. She has been on amitriptyline and nortriptyline and could not tolerate those. She is currently on propanolol. Also takes Effexor and venlafaxine. Does report a lot of stress in her personal life related to death of her grandmother recently and the ongoing pandemic. Currently not employed. Past Medical History:   Diagnosis Date    Depression     has been on meds since 1/11    GERD (gastroesophageal reflux disease)     Psychiatric disorder     Seasonal allergies      Current Outpatient Medications   Medication Sig    venlafaxine (EFFEXOR) 25 mg tablet Take  by mouth.  oxyCODONE-acetaminophen (PERCOCET) 5-325 mg per tablet Take  by mouth every four (4) hours as needed for Pain.  SUMAtriptan (IMITREX) 50 mg tablet Take 1 Tab by mouth once as needed for Migraine for up to 1 dose.  erenumab-aooe (Aimovig Autoinjector) 140 mg/mL injection 1 mL by SubCUTAneous route once for 1 dose.  erenumab-aooe (Aimovig Autoinjector) 70 mg/mL injection 1 mL by SubCUTAneous route every thirty (30) days.     dexAMETHasone (DECADRON) 1 mg tablet TAKE 1 TABLET THREE TIMES A DAY FOR 3 DAYS, THEN 1 TABLET TWO TIMES A DAY FOR 3 DAYS, THEN 1 TABLET ONCE A DAY FOR 3 DAYS AND THEN STOP    lisinopriL (PRINIVIL, ZESTRIL) 20 mg tablet Take 1 Tab by mouth two (2) times a day.  sertraline (ZOLOFT) 50 mg tablet Take 1 Tab by mouth daily.  LORazepam (ATIVAN) 0.5 mg tablet Take 1 Tab by mouth nightly as needed for Anxiety. Max Daily Amount: 0.5 mg.  propranoloL (INDERAL) 20 mg tablet Take 1 Tab by mouth two (2) times a day.  prazosin (MINIPRESS) 2 mg capsule Take 2 mg by mouth nightly.  lisinopril (PRINIVIL, ZESTRIL) 20 mg tablet Take 1 Tab by mouth two (2) times a day.  metFORMIN (GLUCOPHAGE) 500 mg tablet Take  by mouth two (2) times daily (with meals).  hydroCHLOROthiazide (HYDRODIURIL) 25 mg tablet Take 1 Tab by mouth daily. No current facility-administered medications for this visit.       Allergies   Allergen Reactions    Witch Hazel Other (comments)     Family History   Problem Relation Age of Onset    Hypertension Mother     Diabetes Mother     Heart Disease Mother     Diabetes Father     Hypertension Father     Heart Disease Father      Social History     Tobacco Use    Smoking status: Current Every Day Smoker    Smokeless tobacco: Never Used    Tobacco comment: black   Substance Use Topics    Alcohol use: No    Drug use: No     Past Surgical History:   Procedure Laterality Date    HX ADENOIDECTOMY      HX HEENT      jaw surgery    HX TONSILLECTOMY           REVIEW OF SYSTEMS  Review of Systems - History obtained from the patient  Psychological ROS: negative  ENT ROS: negative  Hematological and Lymphatic ROS: negative  Endocrine ROS: negative  Respiratory ROS: no cough, shortness of breath, or wheezing  Cardiovascular ROS: no chest pain or dyspnea on exertion  Gastrointestinal ROS: no abdominal pain, change in bowel habits, or black or bloody stools  Genito-Urinary ROS: no dysuria, trouble voiding, or hematuria  Musculoskeletal ROS: negative  Dermatological ROS: negative      PHYSICAL EXAMINATION:    Visit Vitals  /90   Pulse 74   Resp 16   Ht 5' 4\" (1.626 m)   Wt 116.6 kg (257 lb)   SpO2 98%   BMI 44.11 kg/m²     General:  Well nourished and groomed individual in no acute distress. Neck: Supple, nontender, no bruits, no pain with resistance to active range of motion. Heart: Regular rate and rhythm. Normal S1S2. Lungs:  Equal chest expansion, no cough, no wheeze  Musculoskeletal:  Extremities revealed no edema and had full range of motion of joints. Psych:  Good mood and bright affect    NEUROLOGICAL EXAMINATION:     Mental Status:   Alert and oriented to person, place, and time with recent and remote memory intact. Attention span and concentration are normal. Speech is fluent. Cranial Nerves:    II, III, IV, VI:  Visual acuity grossly intact. Visual fields are normal.    Pupils are equal, round, and reactive to light and accommodation. Extra-ocular movements are full and fluid. Fundoscopic exam was benign, no ptosis or nystagmus. V-XII: Hearing is grossly intact. Facial features are symmetric, with normal sensation and strength. The palate rises symmetrically and the tongue protrudes midline. Sternocleidomastoids 5/5. Motor Examination: Normal tone, bulk, and strength. 5/5 muscle strength throughout. No cogwheel rigidity or clonus present. Sensory exam:  Normal throughout to pinprick, temperature, and vibration sense. Normal proprioception. Coordination:  Finger to nose and rapid arm movement testing was normal.   No resting or intention tremor    Gait and Station:  Steady while walking on toes, heels, and with tandem walking. Normal arm swing. No Rhomberg or pronator drift. No muscle wasting or fasiculations noted. Reflexes:  DTRs 2+ throughout. Toes downgoing.         LABS / IMAGING  Lab Results   Component Value Date/Time    WBC 9.2 10/17/2019 03:24 PM    Hemoglobin (POC) 11.3 02/06/2020 01:55 PM    HGB 9.0 (L) 10/17/2019 03:24 PM    HCT 30.0 (L) 10/17/2019 03:24 PM    PLATELET 591 19/57/1828 03:24 PM    MCV 74 (L) 10/17/2019 03:24 PM     Lab Results   Component Value Date/Time    Sodium 143 10/17/2019 03:24 PM    Potassium 3.5 10/17/2019 03:24 PM    Chloride 105 10/17/2019 03:24 PM    CO2 23 10/17/2019 03:24 PM    Anion gap 10 08/24/2015 08:35 PM    Glucose 85 10/17/2019 03:24 PM    BUN 13 10/17/2019 03:24 PM    Creatinine 0.63 10/17/2019 03:24 PM    BUN/Creatinine ratio 21 10/17/2019 03:24 PM    GFR est  10/17/2019 03:24 PM    GFR est non- 10/17/2019 03:24 PM    Calcium 9.1 10/17/2019 03:24 PM    Bilirubin, total <0.2 10/17/2019 03:24 PM    Alk. phosphatase 88 10/17/2019 03:24 PM    Protein, total 7.2 10/17/2019 03:24 PM    Albumin 3.8 10/17/2019 03:24 PM    Globulin 5.3 (H) 08/24/2015 08:35 PM    A-G Ratio 1.1 (L) 10/17/2019 03:24 PM    ALT (SGPT) 9 10/17/2019 03:24 PM    AST (SGOT) 16 10/17/2019 03:24 PM       ASSESSMENT    ICD-10-CM ICD-9-CM    1. Chronic migraine w/o aura w/o status migrainosus, not intractable  G43.709 346.70 SUMAtriptan (IMITREX) 50 mg tablet      erenumab-aooe (Aimovig Autoinjector) 140 mg/mL injection      erenumab-aooe (Aimovig Autoinjector) 70 mg/mL injection   2. Analgesic overuse headache  G44.40 339.3 dexAMETHasone (DECADRON) 1 mg tablet    T39.95XA E935.9        DISCUSSION  Ms. Maribell Mcgowan has chronic migraines, chronic daily headaches with analgesic overuse/rebound component  We discussed analgesic induced HA and the use of analgesics more than 2-3 times weekly will do nothing but drive the HA and to break this cycle all analgesics, whether OTC or prescribed have to be discontinued and HA likely to get worse before they get better. A short course of steroid was also given.   Since she has been on multiple prophylactics including amitriptyline, propranolol, venlafaxine without benefit, we will start her on Aimovig  Loading dose was given in the office today and she will start 70 mg monthly injection  Use sumatriptan 50 mg as needed for abortive therapy  Continue periodic follow-up    Thank you for allowing me to participate in the care of Ms. Bazan. Please feel free to contact me if you have any questions. I will be happy to follow to follow her along with you. Marlene Bajwa MD  Diplomate, American Board of Psychiatry & Neurology (Neurology)  [facility] Board of Psychiatry & Neurology (Clinical Neurophysiology)  Diplomate, American Board of Electrodiagnostic Medicine    This note will not be viewable in [address]

## 2020-08-28 ENCOUNTER — TELEPHONE (OUTPATIENT)
Dept: NEUROLOGY | Facility: CLINIC | Age: 25
End: 2020-08-28

## 2020-08-28 DIAGNOSIS — G43.709 CHRONIC MIGRAINE W/O AURA W/O STATUS MIGRAINOSUS, NOT INTRACTABLE: Primary | ICD-10-CM

## 2020-08-28 RX ORDER — GALCANEZUMAB 120 MG/ML
120 INJECTION, SOLUTION SUBCUTANEOUS
Qty: 1 SYRINGE | Refills: 3 | Status: SHIPPED | OUTPATIENT
Start: 2020-08-28 | End: 2021-02-19 | Stop reason: ALTCHOICE

## 2020-08-28 NOTE — TELEPHONE ENCOUNTER
Re: Triny Berry denied     Denial rec'd from [employer] via [facility]    Per plan: patient must have a trial of emgality and pregnancy test at baseline. Patient has medicaid and is not eligible to utilize co-pay savings card.

## 2020-09-03 ENCOUNTER — HOSPITAL ENCOUNTER (EMERGENCY)
Age: 25
Discharge: HOME | End: 2020-09-03
Attending: EMERGENCY MEDICINE | Admitting: EMERGENCY MEDICINE
Payer: MEDICAID

## 2020-09-03 ENCOUNTER — APPOINTMENT (OUTPATIENT)
Dept: CT IMAGING | Age: 25
End: 2020-09-03
Attending: EMERGENCY MEDICINE
Payer: MEDICAID

## 2020-09-03 VITALS
DIASTOLIC BLOOD PRESSURE: 96 MMHG | OXYGEN SATURATION: 100 % | RESPIRATION RATE: 18 BRPM | TEMPERATURE: 97.6 F | SYSTOLIC BLOOD PRESSURE: 176 MMHG | HEART RATE: 67 BPM

## 2020-09-03 DIAGNOSIS — R10.2 PELVIC PAIN: Primary | ICD-10-CM

## 2020-09-03 LAB
ALBUMIN SERPL-MCNC: 3.3 G/DL (ref 3.5–5)
ALBUMIN/GLOB SERPL: 0.6 {RATIO} (ref 1.1–2.2)
ALP SERPL-CCNC: 120 U/L (ref 45–117)
ALT SERPL-CCNC: 16 U/L (ref 12–78)
ANION GAP SERPL CALC-SCNC: 7 MMOL/L (ref 5–15)
APPEARANCE UR: CLEAR
AST SERPL-CCNC: 17 U/L (ref 15–37)
BACTERIA URNS QL MICRO: NEGATIVE /HPF
BASOPHILS # BLD: 0 K/UL (ref 0–0.1)
BASOPHILS NFR BLD: 0 % (ref 0–1)
BILIRUB SERPL-MCNC: 0.3 MG/DL (ref 0.2–1)
BILIRUB UR QL: NEGATIVE
BUN SERPL-MCNC: 12 MG/DL (ref 6–20)
BUN/CREAT SERPL: 16 (ref 12–20)
CALCIUM SERPL-MCNC: 9.3 MG/DL (ref 8.5–10.1)
CHLORIDE SERPL-SCNC: 105 MMOL/L (ref 97–108)
CLUE CELLS VAG QL WET PREP: NORMAL
CO2 SERPL-SCNC: 26 MMOL/L (ref 21–32)
COLOR UR: ABNORMAL
COMMENT, HOLDF: NORMAL
CREAT SERPL-MCNC: 0.76 MG/DL (ref 0.55–1.02)
DIFFERENTIAL METHOD BLD: ABNORMAL
EOSINOPHIL # BLD: 0.1 K/UL (ref 0–0.4)
EOSINOPHIL NFR BLD: 1 % (ref 0–7)
EPITH CASTS URNS QL MICRO: ABNORMAL /LPF
ERYTHROCYTE [DISTWIDTH] IN BLOOD BY AUTOMATED COUNT: 16.7 % (ref 11.5–14.5)
GLOBULIN SER CALC-MCNC: 5.1 G/DL (ref 2–4)
GLUCOSE SERPL-MCNC: 96 MG/DL (ref 65–100)
GLUCOSE UR STRIP.AUTO-MCNC: NEGATIVE MG/DL
HCG UR QL: NEGATIVE
HCT VFR BLD AUTO: 35.1 % (ref 35–47)
HGB BLD-MCNC: 10.6 G/DL (ref 11.5–16)
HGB UR QL STRIP: NEGATIVE
HYALINE CASTS URNS QL MICRO: ABNORMAL /LPF (ref 0–5)
IMM GRANULOCYTES # BLD AUTO: 0 K/UL (ref 0–0.04)
IMM GRANULOCYTES NFR BLD AUTO: 0 % (ref 0–0.5)
KETONES UR QL STRIP.AUTO: NEGATIVE MG/DL
LEUKOCYTE ESTERASE UR QL STRIP.AUTO: NEGATIVE
LYMPHOCYTES # BLD: 2.9 K/UL (ref 0.8–3.5)
LYMPHOCYTES NFR BLD: 33 % (ref 12–49)
MCH RBC QN AUTO: 23.7 PG (ref 26–34)
MCHC RBC AUTO-ENTMCNC: 30.2 G/DL (ref 30–36.5)
MCV RBC AUTO: 78.3 FL (ref 80–99)
MONOCYTES # BLD: 0.6 K/UL (ref 0–1)
MONOCYTES NFR BLD: 7 % (ref 5–13)
NEUTS SEG # BLD: 5.3 K/UL (ref 1.8–8)
NEUTS SEG NFR BLD: 59 % (ref 32–75)
NITRITE UR QL STRIP.AUTO: NEGATIVE
NRBC # BLD: 0 K/UL (ref 0–0.01)
NRBC BLD-RTO: 0 PER 100 WBC
PH UR STRIP: 6 [PH] (ref 5–8)
PLATELET # BLD AUTO: 316 K/UL (ref 150–400)
PMV BLD AUTO: 9 FL (ref 8.9–12.9)
POTASSIUM SERPL-SCNC: 3.2 MMOL/L (ref 3.5–5.1)
PROT SERPL-MCNC: 8.4 G/DL (ref 6.4–8.2)
PROT UR STRIP-MCNC: 30 MG/DL
RBC # BLD AUTO: 4.48 M/UL (ref 3.8–5.2)
RBC #/AREA URNS HPF: ABNORMAL /HPF (ref 0–5)
SAMPLES BEING HELD,HOLD: NORMAL
SODIUM SERPL-SCNC: 138 MMOL/L (ref 136–145)
SP GR UR REFRACTOMETRY: 1.02 (ref 1–1.03)
T VAGINALIS VAG QL WET PREP: NORMAL
UR CULT HOLD, URHOLD: NORMAL
UROBILINOGEN UR QL STRIP.AUTO: 0.2 EU/DL (ref 0.2–1)
WBC # BLD AUTO: 8.9 K/UL (ref 3.6–11)
WBC URNS QL MICRO: ABNORMAL /HPF (ref 0–4)

## 2020-09-03 PROCEDURE — 74011000636 HC RX REV CODE- 636: Performed by: RADIOLOGY

## 2020-09-03 PROCEDURE — 85025 COMPLETE CBC W/AUTO DIFF WBC: CPT

## 2020-09-03 PROCEDURE — 74177 CT ABD & PELVIS W/CONTRAST: CPT

## 2020-09-03 PROCEDURE — 96374 THER/PROPH/DIAG INJ IV PUSH: CPT

## 2020-09-03 PROCEDURE — 36415 COLL VENOUS BLD VENIPUNCTURE: CPT

## 2020-09-03 PROCEDURE — 81025 URINE PREGNANCY TEST: CPT

## 2020-09-03 PROCEDURE — 81001 URINALYSIS AUTO W/SCOPE: CPT

## 2020-09-03 PROCEDURE — 99284 EMERGENCY DEPT VISIT MOD MDM: CPT

## 2020-09-03 PROCEDURE — 96375 TX/PRO/DX INJ NEW DRUG ADDON: CPT

## 2020-09-03 PROCEDURE — 80053 COMPREHEN METABOLIC PANEL: CPT

## 2020-09-03 PROCEDURE — 74011000258 HC RX REV CODE- 258: Performed by: RADIOLOGY

## 2020-09-03 PROCEDURE — 74011250636 HC RX REV CODE- 250/636: Performed by: EMERGENCY MEDICINE

## 2020-09-03 PROCEDURE — 87210 SMEAR WET MOUNT SALINE/INK: CPT

## 2020-09-03 PROCEDURE — 87491 CHLMYD TRACH DNA AMP PROBE: CPT

## 2020-09-03 RX ORDER — KETOROLAC TROMETHAMINE 30 MG/ML
30 INJECTION, SOLUTION INTRAMUSCULAR; INTRAVENOUS
Status: COMPLETED | OUTPATIENT
Start: 2020-09-03 | End: 2020-09-03

## 2020-09-03 RX ORDER — SODIUM CHLORIDE 0.9 % (FLUSH) 0.9 %
10 SYRINGE (ML) INJECTION
Status: COMPLETED | OUTPATIENT
Start: 2020-09-03 | End: 2020-09-03

## 2020-09-03 RX ORDER — ONDANSETRON 2 MG/ML
8 INJECTION INTRAMUSCULAR; INTRAVENOUS
Status: COMPLETED | OUTPATIENT
Start: 2020-09-03 | End: 2020-09-03

## 2020-09-03 RX ADMIN — IOPAMIDOL 100 ML: 755 INJECTION, SOLUTION INTRAVENOUS at 03:14

## 2020-09-03 RX ADMIN — KETOROLAC TROMETHAMINE 30 MG: 30 INJECTION, SOLUTION INTRAMUSCULAR at 03:39

## 2020-09-03 RX ADMIN — SODIUM CHLORIDE 100 ML: 900 INJECTION, SOLUTION INTRAVENOUS at 03:14

## 2020-09-03 RX ADMIN — ONDANSETRON 8 MG: 2 INJECTION INTRAMUSCULAR; INTRAVENOUS at 03:39

## 2020-09-03 RX ADMIN — Medication 10 ML: at 03:14

## 2020-09-03 NOTE — DISCHARGE INSTRUCTIONS
Patient Education      Work-up in the emergency department was reassuring tonight including blood work urine and CAT scan of your abdomen pelvis. We have sent off swabs for sexually transmitted diseases. You will be contacted in 48 hours if positive. You can also check my chart in 2 days. Please call and arrange close follow-up with Dr. Fraser Sobtammy your OB/GYN. If you feel your symptoms/condition are acutely worsening such as fever increasing pain vomiting etc. return to the emergency department  Pelvic Pain: Care Instructions  Your Care Instructions     Pelvic pain, or pain in the lower belly, can have many causes. Often pelvic pain is not serious and gets better in a few days. If your pain continues or gets worse, you may need tests and treatment. Tell your doctor about any new symptoms. These may be signs of a serious problem. Follow-up care is a key part of your treatment and safety. Be sure to make and go to all appointments, and call your doctor if you are having problems. It's also a good idea to know your test results and keep a list of the medicines you take. How can you care for yourself at home? · Rest until you feel better. Lie down, and raise your legs by placing a pillow under your knees. · Drink plenty of fluids. You may find that small, frequent sips are easier on your stomach than if you drink a lot at once. Avoid drinks with carbonation or caffeine, such as soda pop, tea, or coffee. · Try eating several small meals instead of 2 or 3 large ones. Eat mild foods, such as rice, dry toast or crackers, bananas, and applesauce. Avoid fatty and spicy foods, other fruits, and alcohol until 48 hours after your symptoms have gone away. · Take an over-the-counter pain medicine, such as acetaminophen (Tylenol), ibuprofen (Advil, Motrin), or naproxen (Aleve). Read and follow all instructions on the label. · Do not take two or more pain medicines at the same time unless the doctor told you to.  Many pain medicines have acetaminophen, which is Tylenol. Too much acetaminophen (Tylenol) can be harmful. · You can put a heating pad, a warm cloth, or moist heat on your belly to relieve pain. When should you call for help? Call your doctor now or seek immediate medical care if:  · You have a new or higher fever. · You have unusual vaginal bleeding. · You have new or worse belly or pelvic pain. · You have vaginal discharge that has increased in amount or smells bad. Watch closely for changes in your health, and be sure to contact your doctor if:  · You do not get better as expected. Where can you learn more? Go to http://tamar-andreia.info/  Enter B514 in the search box to learn more about \"Pelvic Pain: Care Instructions. \"  Current as of: November 8, 2019               Content Version: 12.5  © 5669-9707 Healthwise, Incorporated. Care instructions adapted under license by [employer] (which disclaims liability or warranty for this information). If you have questions about a medical condition or this instruction, always ask your healthcare professional. [address] any warranty or liability for your use of this information.

## 2020-09-03 NOTE — ED TRIAGE NOTES
Patient arrives ambulatory from home with CC of mid lower abdominal pain. Pt reports pain with urination and sitting down x3 weeks. Pt states she is unable to get in to see her doctor. Had an ovarian cyst removal in 02/2020 and reports constant pain since.

## 2020-09-03 NOTE — ED NOTES
Pt chose to swab herself instead of getting a pelvic exam.  Pt provided with discharge instructions. Verbalizes understanding.  Left ambulatory with steady gait, all belongings, and stable VS.

## 2020-09-03 NOTE — ED PROVIDER NOTES
HPI     58-year-old female with a history of depression, acid reflux, ovarian cyst, presents emergency department complaining of lower abdominal pain and pressure. She states Dr. Dipti Cornell operated on her in February for cyst on her ovary. She states she had a postop complication of infection which was bacterial and got better with antibiotics. She states 3 weeks ago that same pain started to come back. She has not been able to see Dr. dinh due to the pandemic. She denies any fevers. She reports nausea but no vomiting. She states she is eating okay. She states she has not had a bowel movement in 2 to 3 days. She states she last took ibuprofen 800 mg this afternoon which is not really helping. She states her pain is a 7 out of 10. She states her pain is worse when she sits. She denies any rectal pain any swelling in the pelvic area vaginal discharge.     Past Medical History:   Diagnosis Date    Depression     has been on meds since 1/11    GERD (gastroesophageal reflux disease)     Psychiatric disorder     Seasonal allergies        Past Surgical History:   Procedure Laterality Date    HX ADENOIDECTOMY      HX GYN  02/2020    ovarian cyst removal     HX HEENT      jaw surgery    HX TONSILLECTOMY           Family History:   Problem Relation Age of Onset    Hypertension Mother     Diabetes Mother     Heart Disease Mother     Diabetes Father     Hypertension Father     Heart Disease Father        Social History     Socioeconomic History    Marital status: SINGLE     Spouse name: Not on file    Number of children: Not on file    Years of education: Not on file    Highest education level: Not on file   Occupational History    Not on file   Social Needs    Financial resource strain: Not on file    Food insecurity     Worry: Not on file     Inability: Not on file    Transportation needs     Medical: Not on file     Non-medical: Not on file   Tobacco Use    Smoking status: Current Every Day Smoker  Smokeless tobacco: Never Used    Tobacco comment: black   Substance and Sexual Activity    Alcohol use: No    Drug use: No    Sexual activity: Not on file   Lifestyle    Physical activity     Days per week: Not on file     Minutes per session: Not on file    Stress: Not on file   Relationships    Social connections     Talks on phone: Not on file     Gets together: Not on file     Attends Sikhism service: Not on file     Active member of club or organization: Not on file     Attends meetings of clubs or organizations: Not on file     Relationship status: Not on file    Intimate partner violence     Fear of current or ex partner: Not on file     Emotionally abused: Not on file     Physically abused: Not on file     Forced sexual activity: Not on file   Other Topics Concern    Not on file   Social History Narrative    Not on file         ALLERGIES: Latex and Witch hazel    Review of Systems   Constitutional: Negative for fever. HENT: Negative for congestion. Eyes: Negative for visual disturbance. Respiratory: Negative for cough and shortness of breath. Cardiovascular: Negative for chest pain. Gastrointestinal: Positive for abdominal pain and nausea. Negative for vomiting. Genitourinary: Positive for pelvic pain. Negative for dysuria, urgency, vaginal discharge and vaginal pain. Musculoskeletal: Negative for gait problem. Skin: Negative for rash. Neurological: Negative for headaches. Psychiatric/Behavioral: Negative for dysphoric mood. Vitals:    09/03/20 0121   BP: 167/79   Pulse: 84   Resp: 16   Temp: 98.3 °F (36.8 °C)   SpO2: 98%            Physical Exam  Constitutional:       General: She is not in acute distress. Appearance: She is well-developed. HENT:      Head: Normocephalic and atraumatic. Mouth/Throat:      Pharynx: No oropharyngeal exudate. Eyes:      General: No scleral icterus. Right eye: No discharge. Left eye: No discharge. Pupils: Pupils are equal, round, and reactive to light. Neck:      Musculoskeletal: Normal range of motion and neck supple. Vascular: No JVD. Cardiovascular:      Rate and Rhythm: Normal rate and regular rhythm. Heart sounds: Normal heart sounds. No murmur. Pulmonary:      Effort: Pulmonary effort is normal. No respiratory distress. Breath sounds: Normal breath sounds. No stridor. No wheezing or rales. Chest:      Chest wall: No tenderness. Abdominal:      General: Bowel sounds are normal. There is no distension. Palpations: Abdomen is soft. There is no mass. Tenderness: There is abdominal tenderness in the suprapubic area. There is no guarding or rebound. Musculoskeletal: Normal range of motion. Skin:     General: Skin is warm and dry. Capillary Refill: Capillary refill takes less than 2 seconds. Findings: No rash. Neurological:      Mental Status: She is oriented to person, place, and time. Psychiatric:         Behavior: Behavior normal.         Thought Content: Thought content normal.         Judgment: Judgment normal.          MDM       Procedures    Labs and CT scan are reassuring. Patient now tells me that she is here with her male partner who is being tested for STDs. She wants to be tested for STDs. Due to volume and acuity in the ED and not enough resources to set up patient for pelvic, I have offered the patient to do self swabbing which she prefers over waiting. We will get swab sent to the lab and send the patient home. I told her she would be contacted if any of her results are positive. She is to follow-up with her OB/GYN.

## 2020-09-04 LAB
C TRACH DNA SPEC QL NAA+PROBE: NEGATIVE
N GONORRHOEA DNA SPEC QL NAA+PROBE: NEGATIVE
SAMPLE TYPE: NORMAL
SERVICE CMNT-IMP: NORMAL
SPECIMEN SOURCE: NORMAL

## 2020-09-17 ENCOUNTER — TELEPHONE (OUTPATIENT)
Dept: NEUROLOGY | Facility: CLINIC | Age: 25
End: 2020-09-17

## 2020-09-17 NOTE — TELEPHONE ENCOUNTER
Re: Emgality approved    Approval rec[facility] via [facility]    Effective 09/17/20-12/17/20  PA- [id number]    Fax sent to [facility]

## 2020-10-01 ENCOUNTER — TELEPHONE (OUTPATIENT)
Dept: PRIMARY CARE CLINIC | Age: 25
End: 2020-10-01

## 2020-10-01 ENCOUNTER — VIRTUAL VISIT (OUTPATIENT)
Dept: PRIMARY CARE CLINIC | Age: 25
End: 2020-10-01
Payer: MEDICAID

## 2020-10-01 DIAGNOSIS — R10.2 PELVIC PAIN: ICD-10-CM

## 2020-10-01 DIAGNOSIS — E28.2 PCOS (POLYCYSTIC OVARIAN SYNDROME): ICD-10-CM

## 2020-10-01 DIAGNOSIS — F32.A DEPRESSION, UNSPECIFIED DEPRESSION TYPE: ICD-10-CM

## 2020-10-01 DIAGNOSIS — Z87.442 HISTORY OF KIDNEY STONES: ICD-10-CM

## 2020-10-01 DIAGNOSIS — I10 ESSENTIAL HYPERTENSION: ICD-10-CM

## 2020-10-01 DIAGNOSIS — J45.909 MODERATE ASTHMA WITHOUT COMPLICATION, UNSPECIFIED WHETHER PERSISTENT: Primary | ICD-10-CM

## 2020-10-01 DIAGNOSIS — R06.83 SNORING: ICD-10-CM

## 2020-10-01 PROCEDURE — 99213 OFFICE O/P EST LOW 20 MIN: CPT | Performed by: INTERNAL MEDICINE

## 2020-10-01 RX ORDER — NEBULIZER AND COMPRESSOR
EACH MISCELLANEOUS
Qty: 1 EACH | Refills: 0 | Status: SHIPPED | OUTPATIENT
Start: 2020-10-01

## 2020-10-01 RX ORDER — ALBUTEROL SULFATE 90 UG/1
1 AEROSOL, METERED RESPIRATORY (INHALATION)
Qty: 1 INHALER | Refills: 3 | Status: SHIPPED | OUTPATIENT
Start: 2020-10-01

## 2020-10-01 RX ORDER — ALBUTEROL SULFATE 0.83 MG/ML
2.5 SOLUTION RESPIRATORY (INHALATION)
Qty: 100 NEBULE | Refills: 1 | Status: SHIPPED | OUTPATIENT
Start: 2020-10-01 | End: 2021-03-03
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
## 2020-10-01 NOTE — TELEPHONE ENCOUNTER
I have referred pt to Pulmonary and previosly referred pt for sleep study. Can we mail bothe referrals to her house since she says she did not reveive the referral for sleep study.

## 2020-11-07 ENCOUNTER — TELEPHONE (OUTPATIENT)
Dept: PRIMARY CARE CLINIC | Age: 25
End: 2020-11-07

## 2020-11-07 NOTE — TELEPHONE ENCOUNTER
Patient air compressor nebulizer is not covered and rejected by patient insurance the following are more likely to be covered as reported by insurance    Pulmo-Aide Compressor tier-1 Required* - DeVilbiss Pulmo-Aide Compressr tier-1 Required* - InnoSpire Essence tier-2 Required* - PulmoNeb LT Compressor Nebul tier-3 Required* - Home Nebulizer Plus Sidestream tier-4 Required* - Procare Compressor Nebulizer tier-5 Required* - InnoSpire Elegance tier-6 Required* - InnoSpire Essence tier-6 Required* - Fidencio [employer] Se[employer] tier-7 Required* - Sidestream tier-8 Required* - Airs Disposable Nebulizer tier-9    All are requiring a prior auth

## 2020-11-10 DIAGNOSIS — J45.909 MODERATE ASTHMA WITHOUT COMPLICATION, UNSPECIFIED WHETHER PERSISTENT: Primary | ICD-10-CM

## 2020-11-19 ENCOUNTER — TELEPHONE (OUTPATIENT)
Dept: NEUROLOGY | Age: 25
End: 2020-11-19

## 2020-11-19 NOTE — TELEPHONE ENCOUNTER
Re: Emgality approved    Approval rec'd from [facility] via [address]  Key: [id number]    Effective 11/19/20-11/19/21  PA # [id number]

## 2021-01-20 ENCOUNTER — OFFICE VISIT (OUTPATIENT)
Dept: RHEUMATOLOGY | Age: 26
End: 2021-01-20
Payer: MEDICAID

## 2021-01-20 VITALS
HEIGHT: 64 IN | HEART RATE: 100 BPM | WEIGHT: 252.6 LBS | SYSTOLIC BLOOD PRESSURE: 136 MMHG | DIASTOLIC BLOOD PRESSURE: 94 MMHG | OXYGEN SATURATION: 97 % | BODY MASS INDEX: 43.13 KG/M2 | TEMPERATURE: 98.2 F | RESPIRATION RATE: 20 BRPM

## 2021-01-20 DIAGNOSIS — M47.816 LUMBAR FACET ARTHROPATHY: Primary | ICD-10-CM

## 2021-01-20 DIAGNOSIS — M15.9 PRIMARY OSTEOARTHRITIS INVOLVING MULTIPLE JOINTS: ICD-10-CM

## 2021-01-20 DIAGNOSIS — M62.838 TRAPEZIUS MUSCLE SPASM: ICD-10-CM

## 2021-01-20 DIAGNOSIS — R05.3 CHRONIC COUGH: ICD-10-CM

## 2021-01-20 PROCEDURE — 99205 OFFICE O/P NEW HI 60 MIN: CPT | Performed by: INTERNAL MEDICINE

## 2021-01-20 RX ORDER — CLINDAMYCIN PHOSPHATE 10 MG/G
GEL TOPICAL 2 TIMES DAILY
COMMUNITY

## 2021-01-20 RX ORDER — DICLOFENAC SODIUM 50 MG/1
TABLET, DELAYED RELEASE ORAL
COMMUNITY
Start: 2021-01-12

## 2021-01-20 RX ORDER — GABAPENTIN 300 MG/1
300 CAPSULE ORAL
Qty: 30 CAP | Refills: 2 | Status: SHIPPED | OUTPATIENT
Start: 2021-01-20 | End: 2021-02-19 | Stop reason: DRUGHIGH

## 2021-01-20 RX ORDER — TIZANIDINE 4 MG/1
4 TABLET ORAL
Qty: 60 TAB | Refills: 0 | Status: SHIPPED | OUTPATIENT
Start: 2021-01-20 | End: 2021-03-03 | Stop reason: SDUPTHER

## 2021-01-20 RX ORDER — IBUPROFEN 800 MG/1
800 TABLET ORAL
COMMUNITY
End: 2022-07-13

## 2021-01-20 RX ORDER — VENLAFAXINE HYDROCHLORIDE 37.5 MG/1
37.5 CAPSULE, EXTENDED RELEASE ORAL
COMMUNITY
End: 2022-07-13

## 2021-01-20 RX ORDER — AMLODIPINE BESYLATE 5 MG/1
TABLET ORAL
COMMUNITY
Start: 2021-01-17 | End: 2021-05-18 | Stop reason: SDUPTHER

## 2021-01-20 NOTE — LETTER
NOTIFICATION RETURN TO WORK / SCHOOL 
 
1/20/2021 2:54 PM 
 
Ms. Glee Kocher [address] [place] [address] To Whom It May Concern: 
 
Glee [facility]oc[facility] the [facility]. She will return to work on: Feb 20th, 2021. If there are questions or concerns please have the patient contact our office.  
 
 
  
Sincerely, 
 
 
 
Lawence Lanes, MD

## 2021-01-20 NOTE — LETTER
1/20/2021 Patient: Solange Alas YOB: 1995 Date of Visit: 1/20/2021 Gely Quiroz MD 
[address] [place] [address] [address] [place] [id number] [address] [employer] Dear Gely Quiroz MD, Thank you for referring Ms. Sherin Steele to [address] [place] [address] for evaluation. My notes for this consultation are attached. If you have questions, please do not hesitate to call me. I look forward to following your patient along with you.  
 
 
Sincerely, 
 
Truong Gunn MD

## 2021-01-20 NOTE — PATIENT INSTRUCTIONS
1. I think you have muscle spasm in the neck and low back as a result of early arthritis, heavy activity, and your extra weight. I'm prescribing a course of gabapentin to try at night; if this works well for you, talk to your PCP about further adjustments. 2. Xrays to identify the degree of bony damage in the neck, low back, and feet. I'm also checking a chest xray since your asthma has worsened--sarcoidosis can sometimes present like this. 3. Labs at your earliest convenience, any [facility]. 
 
4. Gabapentin, take 300mg nightly. I'm also prescribing you a different muscle relaxer, tizanidine, which you should only take once you've seen how you respond to gabapentin. 5. Pain consult, anywhere your insurance will be accepted. 6. Physical therapy to work with you on neck support, shoulder range of motion. 7. Return to work in 4 weeks, if you're feeling better. 8. Return in 6 weeks. If you don't have any sign of autoimmune disease on those tests, then we may need to push you back to your primary care doctor and Pain Management to keep working on pain.

## 2021-01-20 NOTE — PROGRESS NOTES
REASON FOR VISIT:   Sangita Sharp is a 30 y.o. female with history of asthma who is being referred to [facility] at the request of Dr. Joshua Mcelroy, regarding a diagnosis of joint pain. HISTORY OF PRESENT ILLNESS    About two months ago left ankle started swelling painfully without precipitating injury or new medication, went to [facility] where xrays were done and diagnosed with tendonitis. Also seemed to get swelling in a band-like distribution in the low back, couldn't bend over. Was prescribed muscle relaxer, prednisone, and taken out of work. 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 Went back to [facility], told to stop the prednisone, which hadn't helped her joint pain. Continued cyclobenzaprine but didn't find this helpful. Has been on longstanding lisinopril and propranolol for hypertension. Shows a prescription for amlodipine which was prescribed amid the pain episodes, she had been under the impression this was for pain. No sun sensitivity. About a year ago she had to restart standing breathing treatments for asthma which had been well-controlled with rare rescue inhaler use. Maternal grandmother had RA and gout. Mother has RA and gout, and follows in this clinic. No family history of psoriasis. REVIEW OF SYSTEMS  A comprehensive review of systems was negative except for that written in the HPI. A 10-point review of systems is per the new patient questionnaire, which has been reviewed extensively and scanned into the electronic medical record for future reference. Review of systems is as above and is otherwise negative. ALLERGIES  Latex and Witch hazel    MEDICATIONS  Current Outpatient Medications   Medication Sig    ibuprofen (MOTRIN) 800 mg tablet 800 mg.     clindamycin (CLINDAGEL) 1 % topical gel Apply  to affected area two (2) times a day. use thin film on affected area    venlafaxine-SR (EFFEXOR-XR) 37.5 mg capsule 37.5 mg.    amLODIPine (NORVASC) 5 mg tablet     diclofenac EC (VOLTAREN) 50 mg EC tablet TAKE 1 TABLET BY MOUTH 3 TIMES A DAY AS NEEDED FOR PAIN    sertraline (ZOLOFT) 50 mg tablet TAKE 1 TABLET BY MOUTH EVERY DAY    propranoloL (INDERAL) 20 mg tablet TAKE 1 TABLET BY MOUTH TWICE A DAY    albuterol (PROVENTIL HFA, VENTOLIN HFA, PROAIR HFA) 90 mcg/actuation inhaler Take 1 Puff by inhalation every six (6) hours as needed for Wheezing.  albuterol (PROVENTIL VENTOLIN) 2.5 mg /3 mL (0.083 %) nebu 3 mL by Nebulization route every six (6) hours as needed for Wheezing.  Nebulizer & Compressor machine Albuterol    galcanezumab-gnlm (Emgality Pen) 120 mg/mL injection 1 mL by SubCUTAneous route every thirty (30) days.  prazosin (MINIPRESS) 2 mg capsule Take 2 mg by mouth nightly.  lisinopril (PRINIVIL, ZESTRIL) 20 mg tablet Take 1 Tab by mouth two (2) times a day.  hydroCHLOROthiazide (HYDRODIURIL) 25 mg tablet Take 1 Tab by mouth daily.  oxyCODONE-acetaminophen (PERCOCET) 5-325 mg per tablet Take  by mouth every four (4) hours as needed for Pain.  dexAMETHasone (DECADRON) 1 mg tablet TAKE 1 TABLET THREE TIMES A DAY FOR 3 DAYS, THEN 1 TABLET TWO TIMES A DAY FOR 3 DAYS, THEN 1 TABLET ONCE A DAY FOR 3 DAYS AND THEN STOP    LORazepam (ATIVAN) 0.5 mg tablet Take 1 Tab by mouth nightly as needed for Anxiety. Max Daily Amount: 0.5 mg.    metFORMIN (GLUCOPHAGE) 500 mg tablet Take  by mouth two (2) times daily (with meals). No current facility-administered medications for this visit.         PAST MEDICAL HISTORY  Past Medical History:   Diagnosis Date    Depression     has been on meds since 1/11    GERD (gastroesophageal reflux disease)     Psychiatric disorder     Seasonal allergies        FAMILY HISTORY  family history includes Arthritis-rheumatoid in her maternal grandmother and mother; Diabetes in her father and mother; Gout in her mother; Heart Disease in her father and mother; Hypertension in her father and mother; MS in her maternal grandmother. SOCIAL HISTORY  She  reports that she has been smoking. She has never used smokeless tobacco. She reports previous alcohol use. She reports previous drug use. Drug: Marijuana. Social History     Social History Narrative    Not on file   CNA, cuts hair  Resumed smoking, <1pack/week      DATA  Visit Vitals  BP (!) 136/94 (BP 1 Location: Right arm, BP Patient Position: Sitting)   Pulse 100   Temp 98.2 °F (36.8 °C) (Oral)   Resp 20   Ht 5' 4\" (1.626 m)   Wt 252 lb 9.6 oz (114.6 kg)   LMP 12/21/2020 (Approximate)   SpO2 97%   BMI 43.36 kg/m²    Body mass index is 43.36 kg/m². No flowsheet data found. General:  The patient is pleasant, morbidly obese, alert, and in no apparent distress. Eyes: Sclera are anicteric. No conjunctival injection. HEENT:  Oropharynx is clear. No oral ulcers. Adequate salivary pooling. No cervical or supraclavicular lymphadenopathy. Lungs:  Clear to auscultation bilaterally, without wheeze or stridor. Normal respiratory effort. Cor:  Regular rate and rhythm. No murmur rub or gallop. Abdomen: Soft, non-tender, without hepatomegaly or masses. Extremities: No calf tenderness or edema. Warm and well perfused. Skin:  No significant abnormalities. No petechial, purpuric, or psoriaform rashes. No erythema nodosum. Scattered tattoos without nodularity or clearing. Neuro: Nonfocal, negative seated SLR bilaterally. Musculoskeletal:    A comprehensive musculoskeletal exam was performed for all joints of each upper and lower extremity and assessed for swelling, tenderness and range of motion. Results are documented as below:  Bilateral trapezius spasm. Leftward cervical rotation limited to ~30deg from midline, right to 45deg. Bilateral AC tenderness, active shoulder abduction limited to 80deg.   Left > right paralumbar tenderness. Right lateral ankle tenderness without emi effusion. No evidence of synovitis in the small joints of the hands, wrists, shoulders, elbows, hips, knees or ankles. Labs:  20: HIV neg, RPR neg, Hep C Ab neg, Hep B SAg neg    Imagin/3/20 CT abd/pelvis: 1. Bilateral nonobstructive nephrolithiasis. No hydronephrosis. 2. No evidence of acute process. ASSESSMENT AND PLAN  Ms. Gelacio Frausto is a 30 y.o. female who presents for evaluation of degenerative character arthralgia with cervicalgia and trapezius spasm as well as prominent ankle pain, though without clear trigger of her recent worsening. Have a low suspicion for inflammatory arthritis, though will complete serologic evaluation. Emphasized followup with pain management and PT, am providing gabapentin for relief in the meantime, though informed her that if her inflammatory arthritis workup remains unremarkable next visit, we will need her PCP and Pain Management to mange her osteoarthritic pain. 1. Lumbar facet arthropathy  - REFERRAL TO PAIN MANAGEMENT  - REFERRAL TO PHYSICAL THERAPY  - gabapentin (NEURONTIN) 300 mg capsule; Take 1 Cap by mouth nightly. Max Daily Amount: 300 mg. Dispense: 30 Cap; Refill: 2  - RHEUMATOID FACTOR, QL; Future  - CYCLIC CITRUL PEPTIDE AB, IGG; Future  - C REACTIVE PROTEIN, QT; Future  - CBC WITH AUTOMATED DIFF; Future  - METABOLIC PANEL, COMPREHENSIVE; Future  - SED RATE (ESR); Future    2. Trapezius muscle spasm  - REFERRAL TO PAIN MANAGEMENT  - REFERRAL TO PHYSICAL THERAPY  - gabapentin (NEURONTIN) 300 mg capsule; Take 1 Cap by mouth nightly. Max Daily Amount: 300 mg. Dispense: 30 Cap; Refill: 2  - RHEUMATOID FACTOR, QL; Future  - CYCLIC CITRUL PEPTIDE AB, IGG; Future  - C REACTIVE PROTEIN, QT; Future  - CBC WITH AUTOMATED DIFF; Future  - METABOLIC PANEL, COMPREHENSIVE; Future  - SED RATE (ESR); Future    3.  Primary osteoarthritis involving multiple joints  - REFERRAL TO PAIN MANAGEMENT  - XR CHEST PA LAT; Future  - XR SPINE CERV 4 OR 5 V; Future  - XR SPINE LUMB MIN 4 V; Future  - tiZANidine (ZANAFLEX) 4 mg tablet; Take 1 Tab by mouth every six (6) hours as needed for Muscle Spasm(s). Dispense: 60 Tab; Refill: 0  - XR FOOT RT MIN 3 V; Future  - XR FOOT LT MIN 3 V; Future  - XR HAND RT MIN 3 V; Future  - XR HAND LT MIN 3 V; Future  - REFERRAL TO PHYSICAL THERAPY  - RHEUMATOID FACTOR, QL; Future  - CYCLIC CITRUL PEPTIDE AB, IGG; Future  - C REACTIVE PROTEIN, QT; Future  - CBC WITH AUTOMATED DIFF; Future  - METABOLIC PANEL, COMPREHENSIVE; Future  - SED RATE (ESR); Future    4. Chronic cough  - XR CHEST PA LAT; Future    Patient Instructions   1. I think you have muscle spasm in the neck and low back as a result of early arthritis, heavy activity, and your extra weight. I'm prescribing a course of gabapentin to try at night; if this works well for you, talk to your PCP about further adjustments. 2. Xrays to identify the degree of bony damage in the neck, low back, and feet. I'm also checking a chest xray since your asthma has worsened--sarcoidosis can sometimes present like this. 3. Labs at your earliest convenience, any [facility].    4. Gabapentin, take 300mg nightly. I'm also prescribing you a different muscle relaxer, tizanidine, which you should only take once you've seen how you respond to gabapentin. 5. Pain consult, anywhere your insurance will be accepted. 6. Physical therapy to work with you on neck support, shoulder range of motion. 7. Return to work in 4 weeks, if you're feeling better. 8. Return in 6 weeks. If you don't have any sign of autoimmune disease on those tests, then we may need to push you back to your primary care doctor and Pain Management to keep working on pain.            Orders Placed This Encounter    ibuprofen (MOTRIN) 800 mg tablet    clindamycin (CLINDAGEL) 1 % topical gel    venlafaxine-SR (EFFEXOR-XR) 37.5 mg capsule    amLODIPine (NORVASC) 5 mg tablet    diclofenac EC (VOLTAREN) 50 mg EC tablet       Medications: I am having Cherylene Juba maintain her metFORMIN, hydroCHLOROthiazide, prazosin, lisinopriL, LORazepam, oxyCODONE-acetaminophen, dexAMETHasone, Emgality Pen, albuterol, albuterol, Nebulizer & Compressor, sertraline, propranoloL, ibuprofen, clindamycin, venlafaxine-SR, amLODIPine, and diclofenac EC. Follow up: Return in about 6 weeks (around 3/3/2021).     Eduardo Moon MD    Adult Rheumatology   [facility]  A [facility] of [facility], [address] [facility], [address] [place] [address] Road   Phone [phone]  Fax [phone]

## 2021-01-25 ENCOUNTER — APPOINTMENT (OUTPATIENT)
Dept: FAMILY MEDICINE CLINIC | Age: 26
End: 2021-01-25

## 2021-01-25 DIAGNOSIS — M62.838 TRAPEZIUS MUSCLE SPASM: ICD-10-CM

## 2021-01-25 DIAGNOSIS — M47.816 LUMBAR FACET ARTHROPATHY: ICD-10-CM

## 2021-01-25 DIAGNOSIS — M15.9 PRIMARY OSTEOARTHRITIS INVOLVING MULTIPLE JOINTS: ICD-10-CM

## 2021-01-26 LAB
ALBUMIN SERPL-MCNC: 3.8 G/DL (ref 3.9–5)
ALBUMIN/GLOB SERPL: 1.3 {RATIO} (ref 1.2–2.2)
ALP SERPL-CCNC: 101 IU/L (ref 39–117)
ALT SERPL-CCNC: 11 IU/L (ref 0–32)
AST SERPL-CCNC: 16 IU/L (ref 0–40)
BASOPHILS # BLD AUTO: 0 X10E3/UL (ref 0–0.2)
BASOPHILS NFR BLD AUTO: 0 %
BILIRUB SERPL-MCNC: <0.2 MG/DL (ref 0–1.2)
BUN SERPL-MCNC: 12 MG/DL (ref 6–20)
BUN/CREAT SERPL: 18 (ref 9–23)
CALCIUM SERPL-MCNC: 9.2 MG/DL (ref 8.7–10.2)
CCP IGA+IGG SERPL IA-ACNC: 8 UNITS (ref 0–19)
CHLORIDE SERPL-SCNC: 106 MMOL/L (ref 96–106)
CO2 SERPL-SCNC: 26 MMOL/L (ref 20–29)
CREAT SERPL-MCNC: 0.65 MG/DL (ref 0.57–1)
CRP SERPL-MCNC: 13 MG/L (ref 0–10)
EOSINOPHIL # BLD AUTO: 0.1 X10E3/UL (ref 0–0.4)
EOSINOPHIL NFR BLD AUTO: 2 %
ERYTHROCYTE [DISTWIDTH] IN BLOOD BY AUTOMATED COUNT: 16.1 % (ref 11.7–15.4)
ERYTHROCYTE [SEDIMENTATION RATE] IN BLOOD BY WESTERGREN METHOD: 49 MM/HR (ref 0–32)
GLOBULIN SER CALC-MCNC: 2.9 G/DL (ref 1.5–4.5)
GLUCOSE SERPL-MCNC: 87 MG/DL (ref 65–99)
HCT VFR BLD AUTO: 35 % (ref 34–46.6)
HGB BLD-MCNC: 10.8 G/DL (ref 11.1–15.9)
IMM GRANULOCYTES # BLD AUTO: 0 X10E3/UL (ref 0–0.1)
IMM GRANULOCYTES NFR BLD AUTO: 0 %
LYMPHOCYTES # BLD AUTO: 2.3 X10E3/UL (ref 0.7–3.1)
LYMPHOCYTES NFR BLD AUTO: 26 %
MCH RBC QN AUTO: 24.4 PG (ref 26.6–33)
MCHC RBC AUTO-ENTMCNC: 30.9 G/DL (ref 31.5–35.7)
MCV RBC AUTO: 79 FL (ref 79–97)
MONOCYTES # BLD AUTO: 0.5 X10E3/UL (ref 0.1–0.9)
MONOCYTES NFR BLD AUTO: 6 %
NEUTROPHILS # BLD AUTO: 5.8 X10E3/UL (ref 1.4–7)
NEUTROPHILS NFR BLD AUTO: 66 %
PLATELET # BLD AUTO: 259 X10E3/UL (ref 150–450)
POTASSIUM SERPL-SCNC: 4 MMOL/L (ref 3.5–5.2)
PROT SERPL-MCNC: 6.7 G/DL (ref 6–8.5)
RBC # BLD AUTO: 4.42 X10E6/UL (ref 3.77–5.28)
RHEUMATOID FACT SERPL-ACNC: <10 IU/ML (ref 0–13.9)
SODIUM SERPL-SCNC: 143 MMOL/L (ref 134–144)
WBC # BLD AUTO: 8.8 X10E3/UL (ref 3.4–10.8)

## 2021-01-28 ENCOUNTER — HOSPITAL ENCOUNTER (OUTPATIENT)
Dept: PHYSICAL THERAPY | Age: 26
Discharge: HOME | End: 2021-01-28
Payer: MEDICAID

## 2021-01-28 PROCEDURE — 97161 PT EVAL LOW COMPLEX 20 MIN: CPT | Performed by: PHYSICAL THERAPIST
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
## 2021-01-28 NOTE — PROGRESS NOTES
[facility]  [address]  [facility] [place], [address]  Phone: [phone]  Fax: [phone]    Plan of Care/Statement of Necessity for Physical Therapy Services  2-15    Patient name: Fara Gutierrez  : 1995  Provider#: [id number]  Referral source: Cintia Martínez MD      Medical/Treatment Diagnosis: Pain in right shoulder [M25.511]  Left shoulder pain [M25.512]  Lower back pain [M54.5]     Prior Hospitalization: see medical history     Comorbidities: see evaluation. Prior Level of Function: see evaluation. Medications: Verified on Patient Summary List    Start of Care: see evaluation. Onset Date: See evaluation       The Plan of Care and following information is based on the information from the initial evaluation. Assessment/ key information: Pt is a 29 yo female with chronic history of low back pain with 3 week onset of severe pain in her neck, upper back, lower back and ankles. Pt with severe pain throughout evaluation today and was unable to transfer sit to supine or sit to supine to prone for full evaluation. Pt with decreased ROM, decreased balance, decreased strength and increased TTP. Pt also showing fear avoidance behaviors including unwillingness to attempt independent sit to supine transfer and reporting she is laying in bed for hours during the day. We discussed importance of short walks every 1 or so at home to avoid deconditioning.           Treatment Plan may include any combination of the following: Therapeutic exercise, Therapeutic activities, Neuromuscular re-education, Physical agent/modality, Gait/balance training, Manual therapy and Patient education  Patient / Family readiness to learn indicated by: asking questions, trying to perform skills and interest  Persons(s) to be included in education: patient (P)  Barriers to Learning/Limitations: None  Patient Goal (s): see eval  Patient Self Reported Health Status: good  Rehabilitation Potential: good    Short Term Goals: To be accomplished in 2-3 weeks:   1) Pt independent in HEP from day 1      Long Term Goals: To be accomplished in 4-6 weeks:   1) Pt independent in final HEP. 2) Pt will be able to transfer sit to supine independently and without pain > 2/10. 3) Pt will report she is able to bend over to feed her dogs food without pain > 2/10   4) Pt will be able to transfer sit to stand without pain > 2/10       Frequency / Duration: Patient to be seen 2 times per week for 4-6 weeks. Patient/ Caregiver education and instruction: self care and exercises    [x]  Plan of care has been reviewed with CASPER Dukes, PT DPT, OCS 1/28/2021 3:20 PM    ________________________________________________________________________    I certify that the above Therapy Services are being furnished while the patient is under my care. I agree with the treatment plan and certify that this therapy is necessary.     [de-identified] Signature:____________________  Date:____________Time: _________

## 2021-02-04 ENCOUNTER — APPOINTMENT (OUTPATIENT)
Dept: PHYSICAL THERAPY | Age: 26
End: 2021-02-04
Payer: MEDICAID

## 2021-02-09 ENCOUNTER — HOSPITAL ENCOUNTER (OUTPATIENT)
Dept: PHYSICAL THERAPY | Age: 26
Discharge: HOME | End: 2021-02-09
Payer: MEDICAID

## 2021-02-09 PROCEDURE — 97110 THERAPEUTIC EXERCISES: CPT | Performed by: PHYSICAL THERAPIST

## 2021-02-09 NOTE — PROGRESS NOTES
PT DAILY TREATMENT NOTE - [facility] 2-15    Patient Name: Krys Kong  Date:2021  : 1995  [x]  Patient  Verified  Payor: Joya Babinski / Plan: [facility] COMMUNITY PLAN [employer] / Product Type: Managed Care Medicaid /    In time:555  Out time:640  Total Treatment Time (min): 45  Total Timed Codes (min): 35  1:1 Treatment Time ( only): 35   Visit #: 2    Treatment Area: Pain in right shoulder [M25.511]  Left shoulder pain [M25.512]  Lower back pain [M54.5]    SUBJECTIVE  Pain Level (0-10 scale), subjective functional status/changes: Pt reports 5/10. Pt reports she is still experiencing a lot of pain. She took a muscle relaxor before she can in today. Any medication changes, allergies to medications, adverse drug reactions, diagnosis change, or new procedure performed?: [x] No    [] Yes (see summary sheet for update) SEE ABOVE. OBJECTIVE     -    10 min Modality:      [x]  Ice     []  Heat       Position/location:   Low back and cervical spine x 10 ' pt seated. Rationale: decrease pain to improve the patients ability to do functional activities   [x] Skin assessment post-treatment:  [x]intact []redness- no adverse reaction    []redness  adverse reaction:      35 min Therapeutic Exercise:  [x] See flow sheet : pt closing eyes with standing rows reporting this is how she govind with pain. Rationale: increase strength, improve coordination and increase proprioception to improve the patients ability to walk.      - min Manual Therapy:  -   Rationale: decrease pain, increase tissue extensibility and decrease trigger points  to improve the patients ability to -          With   [] TE   [] TA   [] neuro   [] other: Patient Education: [x] Review HEP    [] Progressed/Changed HEP based on:   [] positioning   [] body mechanics   [] transfers   [] heat/ice application    [] other:        Pain Level (0-10 scale) post treatment: 5    ASSESSMENT/Changes in Function:   Pt having difficulty with tolerating seated hip adduction, seated hip ABD/ER, standing rows - pt performed 10 reps each. Pt frequently closing her eyes reporting due to pain with standing rows so we stopped at 7 reps as she continues to close her eyes. Pt did walk 2 laps around the tx gym - discussed trial with rec. Bike but pt did not want to try this today. Pt with fair tolerance to PT overall and we will continue trial with PT to see if she is able to progress in following visits. She reports she is still having her significant other assist her in bed mobility as well. Patient will continue to benefit from skilled PT services to modify and progress therapeutic interventions, address functional mobility deficits, analyze and address soft tissue restrictions, assess and modify postural abnormalities and instruct in home and community integration to attain remaining goals. Progress towards goals / Updated goals:  Goals were not re-assessed today.      PLAN      [x]  Continue plan of care    []  Other:_      Dalia Rivers, PT DPT, OCS 2/9/2021  4:16 PM       PT License #[id number]

## 2021-02-15 ENCOUNTER — VIRTUAL VISIT (OUTPATIENT)
Dept: PRIMARY CARE CLINIC | Age: 26
End: 2021-02-15
Payer: MEDICAID

## 2021-02-15 DIAGNOSIS — E66.01 MORBID OBESITY WITH BMI OF 40.0-44.9, ADULT (HCC): Primary | ICD-10-CM

## 2021-02-15 DIAGNOSIS — M54.50 CHRONIC MIDLINE LOW BACK PAIN, UNSPECIFIED WHETHER SCIATICA PRESENT: ICD-10-CM

## 2021-02-15 DIAGNOSIS — D64.9 ANEMIA, UNSPECIFIED TYPE: ICD-10-CM

## 2021-02-15 DIAGNOSIS — G89.29 CHRONIC MIDLINE LOW BACK PAIN, UNSPECIFIED WHETHER SCIATICA PRESENT: ICD-10-CM

## 2021-02-15 PROCEDURE — 99213 OFFICE O/P EST LOW 20 MIN: CPT | Performed by: INTERNAL MEDICINE

## 2021-02-15 RX ORDER — LANOLIN ALCOHOL/MO/W.PET/CERES
325 CREAM (GRAM) TOPICAL 2 TIMES DAILY
Qty: 60 TAB | Refills: 3 | Status: SHIPPED | OUTPATIENT
Start: 2021-02-15 | End: 2021-05-18 | Stop reason: SDUPTHER

## 2021-02-16 NOTE — PROGRESS NOTES
Krys Kong is a 30 y.o. female who was seen by synchronous (real-time) audio-video technology on 2/15/2021 for Depression, Tailbone Pain, and Morbid Obesity        Assessment & Plan:   Diagnoses and all orders for this visit:    1. Morbid obesity with BMI of 40.0-44.9, adult ([address]  -     REFERRAL TO BARIATRIC SURGERY    2. Chronic midline low back pain, unspecified whether sciatica present  -     REFERRAL TO ORTHOPEDICS    3. Anemia, unspecified type  -     ferrous sulfate 325 mg (65 mg iron) tablet; Take 1 Tab by mouth two (2) times a day. Asked patient to follow-up with rheumatologist.  Asked patient to get the x-rays done and follow-up with him. With regard to lower back pain-she needs to see orthopedic specialist.    Since she is overweight and has chronic back pain and body aches. She may be a good candidate for weight loss surgery. I spent at least 20 minutes on this visit with this established patient. Subjective:       Prior to Admission medications    Medication Sig Start Date End Date Taking? Authorizing Provider   ferrous sulfate 325 mg (65 mg iron) tablet Take 1 Tab by mouth two (2) times a day. 2/15/21  Yes Mario Jackson MD   ibuprofen (MOTRIN) 800 mg tablet 800 mg. Provider, Historical   clindamycin (CLINDAGEL) 1 % topical gel Apply  to affected area two (2) times a day. use thin film on affected area    Provider, Historical   venlafaxine-SR (EFFEXOR-XR) 37.5 mg capsule 37.5 mg.    Provider, Historical   amLODIPine (NORVASC) 5 mg tablet  1/17/21   Provider, Historical   diclofenac EC (VOLTAREN) 50 mg EC tablet TAKE 1 TABLET BY MOUTH 3 TIMES A DAY AS NEEDED FOR PAIN 1/12/21   Provider, Historical   tiZANidine (ZANAFLEX) 4 mg tablet Take 1 Tab by mouth every six (6) hours as needed for Muscle Spasm(s). 1/20/21   Collins Forbes MD   gabapentin (NEURONTIN) 300 mg capsule Take 1 Cap by mouth nightly.  Max Daily Amount: 300 mg. 1/20/21   Collins Forbes MD   sertraline (ZOLOFT) 50 mg tablet TAKE 1 TABLET BY MOUTH EVERY DAY 1/11/21   Viridiana Mims MD   propranoloL (INDERAL) 20 mg tablet TAKE 1 TABLET BY MOUTH TWICE A DAY 1/11/21   Adry DOZIER MD   albuterol (PROVENTIL HFA, VENTOLIN HFA, PROAIR HFA) 90 mcg/actuation inhaler Take 1 Puff by inhalation every six (6) hours as needed for Wheezing. 10/1/20   Viridiana Mims MD   albuterol (PROVENTIL VENTOLIN) 2.5 mg /3 mL (0.083 %) nebu 3 mL by Nebulization route every six (6) hours as needed for Wheezing. 10/1/20   Viridiana Mims MD   Nebulizer & Compressor machine Albuterol 10/1/20   Viridiana Mims MD   galcanezumab-gnlm (Emgality Pen) 120 mg/mL injection 1 mL by SubCUTAneous route every thirty (30) days. 8/28/20   Berta Burnett MD   oxyCODONE-acetaminophen (PERCOCET) 5-325 mg per tablet Take  by mouth every four (4) hours as needed for Pain. Provider, Historical   dexAMETHasone (DECADRON) 1 mg tablet TAKE 1 TABLET THREE TIMES A DAY FOR 3 DAYS, THEN 1 TABLET TWO TIMES A DAY FOR 3 DAYS, THEN 1 TABLET ONCE A DAY FOR 3 DAYS AND THEN STOP 8/27/20   Arpit Vu MD   LORazepam (ATIVAN) 0.5 mg tablet Take 1 Tab by mouth nightly as needed for Anxiety. Max Daily Amount: 0.5 mg. 8/18/20   Viridiana Mims MD   prazosin (MINIPRESS) 2 mg capsule Take 2 mg by mouth nightly. Provider, Historical   lisinopril (PRINIVIL, ZESTRIL) 20 mg tablet Take 1 Tab by mouth two (2) times a day. 2/6/20   Viridiana Mims MD   metFORMIN (GLUCOPHAGE) 500 mg tablet Take  by mouth two (2) times daily (with meals). Provider, Historical   hydroCHLOROthiazide (HYDRODIURIL) 25 mg tablet Take 1 Tab by mouth daily. 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 Sometimes she has to move them and pick them. She complains of pain in her lower back. She also has pain in different joints.   [facility].  [facility] ordered x-rays. Patient mentioned that she has not been able to do it and does not know where to go. She was also referred to pain management by rheumatologist.  Patient reports that she gets lower back pain. She is also overweight and her most recent BMI was 43. She does see a psychiatrist for depression. He says that she has not been able to work due to her symptoms and that she is looking for help. ROS    Objective:   No flowsheet data found.      [INSTRUCTIONS:  \"[x]\" Indicates a positive item  \"[]\" Indicates a negative item  -- DELETE ALL ITEMS NOT EXAMINED]    Constitutional: [x] Appears well-developed and well-nourished [x] No apparent distress      [] Abnormal -     Mental status: [x] Alert and awake  [x] Oriented to person/place/time [x] Able to follow commands    [] Abnormal -     Eyes:   EOM    [x]  Normal    [] Abnormal -   Sclera  [x]  Normal    [] Abnormal -          Discharge [x]  None visible   [] Abnormal -     HENT: [x] Normocephalic, atraumatic  [] Abnormal -   [x] Mouth/Throat: Mucous membranes are moist    External Ears [x] Normal  [] Abnormal -    Neck: [x] No visualized mass [] Abnormal -     Pulmonary/Chest: [x] Respiratory effort normal   [x] No visualized signs of difficulty breathing or respiratory distress        [] Abnormal -      Musculoskeletal:   [x] Normal gait with no signs of ataxia         [x] Normal range of motion of neck        [] Abnormal -     Neurological:        [x] No Facial Asymmetry (Cranial nerve 7 motor function) (limited exam due to video visit)          [x] No gaze palsy        [] Abnormal -          Skin:        [x] No significant exanthematous lesions or discoloration noted on facial skin         [] Abnormal -            Psychiatric:       [x] Normal Affect [] Abnormal -        [x] No Hallucinations    Other pertinent observable physical exam findings:-        We discussed the expected course, resolution and complications of the diagnosis(es) in detail. Medication risks, benefits, costs, interactions, and alternatives were discussed as indicated. I advised her to contact the office if her condition worsens, changes or fails to improve as anticipated. She expressed understanding with the diagnosis(es) and plan. Latonya Sharita, who was evaluated through a patient-initiated, synchronous (real-time) audio-video encounter, and/or her healthcare decision maker, is aware that it is a billable service, with coverage as determined by her insurance carrier. She provided verbal consent to proceed: Yes, and patient identification was verified. It was conducted pursuant to the emergency declaration under the [facility], [address] [place] [address] authority and the [employer] Act. A caregiver was present when appropriate. Ability to conduct physical exam was limited. I was in the office. The patient was at home.       Akin Wing MD

## 2021-02-18 ENCOUNTER — APPOINTMENT (OUTPATIENT)
Dept: PHYSICAL THERAPY | Age: 26
End: 2021-02-18
Payer: MEDICAID

## 2021-02-19 ENCOUNTER — HOSPITAL ENCOUNTER (OUTPATIENT)
Dept: GENERAL RADIOLOGY | Age: 26
Discharge: HOME | End: 2021-02-19
Attending: INTERNAL MEDICINE
Payer: MEDICAID

## 2021-02-19 ENCOUNTER — VIRTUAL VISIT (OUTPATIENT)
Dept: NEUROLOGY | Age: 26
End: 2021-02-19
Payer: MEDICAID

## 2021-02-19 ENCOUNTER — DOCUMENTATION ONLY (OUTPATIENT)
Dept: NEUROLOGY | Age: 26
End: 2021-02-19

## 2021-02-19 DIAGNOSIS — R05.3 CHRONIC COUGH: ICD-10-CM

## 2021-02-19 DIAGNOSIS — G43.709 CHRONIC MIGRAINE W/O AURA W/O STATUS MIGRAINOSUS, NOT INTRACTABLE: Primary | ICD-10-CM

## 2021-02-19 DIAGNOSIS — M15.9 PRIMARY OSTEOARTHRITIS INVOLVING MULTIPLE JOINTS: ICD-10-CM

## 2021-02-19 PROCEDURE — 73630 X-RAY EXAM OF FOOT: CPT

## 2021-02-19 PROCEDURE — 73130 X-RAY EXAM OF HAND: CPT

## 2021-02-19 PROCEDURE — 71046 X-RAY EXAM CHEST 2 VIEWS: CPT

## 2021-02-19 PROCEDURE — 72050 X-RAY EXAM NECK SPINE 4/5VWS: CPT

## 2021-02-19 PROCEDURE — 99214 OFFICE O/P EST MOD 30 MIN: CPT | Performed by: PSYCHIATRY & NEUROLOGY

## 2021-02-19 PROCEDURE — 72100 X-RAY EXAM L-S SPINE 2/3 VWS: CPT

## 2021-02-19 RX ORDER — GABAPENTIN 300 MG/1
300 CAPSULE ORAL 2 TIMES DAILY
Qty: 60 CAP | Refills: 1 | Status: SHIPPED | OUTPATIENT
Start: 2021-02-19 | End: 2022-07-13

## 2021-02-19 RX ORDER — ERENUMAB-AOOE 140 MG/ML
140 INJECTION, SOLUTION SUBCUTANEOUS
Qty: 1 EACH | Refills: 2 | Status: SHIPPED | OUTPATIENT
Start: 2021-02-19

## 2021-02-19 NOTE — PROGRESS NOTES
Chief Complaint   Patient presents with    Migraine     This is a telemedicine visit that was performed with the originating site at [address] [place] [address] and the distant site at patient's home. Verbal consent to participate in video visit was obtained. The patient was identified by name and date of birth. This visit occurred during the Coronavirus [phone]) [facility] Emergency. I discussed with the patient the nature of our telemedicine visits, that:     I would evaluate the patient and recommend diagnostics and treatments based on my assessment   Our sessions are not being recorded and that personal health information is protected   Our team would provide follow up care in person if/when the patient needs it      [address] was evaluated over a video call today. She states that she was doing quite well on Emgality injections but now it only helps for about a week and then the headaches start to come back again. She has been taking ibuprofen 800 mg almost daily. She has also been dealing with back pain, generalized fatigue/malaise and has been seeing a rheumatologist because she had elevated sed rate and anemia. No conclusive diagnosis has been made so far. RECAP  She has had headaches since 4years of age. She continues to get them very frequently and has a headache almost daily. She describes different types of headaches. She always keeps a mild dull headache in the background. At times it will become pulsating or throbbing, in the vertex region associated with light sensitivity and noise sensitivity. Occasionally she will feel nauseous. She would prefer to go into a dark room when this happens. She has been using ibuprofen almost daily for the past several months. She cannot think of any triggers. There are no other focal motor or sensory deficits associated with the headaches.   She has been on amitriptyline and nortriptyline and could not tolerate those. She is currently on propanolol. Also takes Effexor and venlafaxine. Does report a lot of stress in her personal life related to death of her grandmother recently and the ongoing pandemic. Currently not employed. Current Outpatient Medications   Medication Sig    erenumab-aooe (Aimovig Autoinjector) 140 mg/mL injection 1 mL by SubCUTAneous route every thirty (30) days.  gabapentin (NEURONTIN) 300 mg capsule Take 1 Cap by mouth two (2) times a day. Max Daily Amount: 600 mg.  ferrous sulfate 325 mg (65 mg iron) tablet Take 1 Tab by mouth two (2) times a day.  ibuprofen (MOTRIN) 800 mg tablet 800 mg.  clindamycin (CLINDAGEL) 1 % topical gel Apply  to affected area two (2) times a day. use thin film on affected area    venlafaxine-SR (EFFEXOR-XR) 37.5 mg capsule 37.5 mg.    amLODIPine (NORVASC) 5 mg tablet     tiZANidine (ZANAFLEX) 4 mg tablet Take 1 Tab by mouth every six (6) hours as needed for Muscle Spasm(s).  sertraline (ZOLOFT) 50 mg tablet TAKE 1 TABLET BY MOUTH EVERY DAY    propranoloL (INDERAL) 20 mg tablet TAKE 1 TABLET BY MOUTH TWICE A DAY    albuterol (PROVENTIL HFA, VENTOLIN HFA, PROAIR HFA) 90 mcg/actuation inhaler Take 1 Puff by inhalation every six (6) hours as needed for Wheezing.  albuterol (PROVENTIL VENTOLIN) 2.5 mg /3 mL (0.083 %) nebu 3 mL by Nebulization route every six (6) hours as needed for Wheezing.  Nebulizer & Compressor machine Albuterol    LORazepam (ATIVAN) 0.5 mg tablet Take 1 Tab by mouth nightly as needed for Anxiety. Max Daily Amount: 0.5 mg.    prazosin (MINIPRESS) 2 mg capsule Take 2 mg by mouth nightly.  lisinopril (PRINIVIL, ZESTRIL) 20 mg tablet Take 1 Tab by mouth two (2) times a day.  diclofenac EC (VOLTAREN) 50 mg EC tablet TAKE 1 TABLET BY MOUTH 3 TIMES A DAY AS NEEDED FOR PAIN    oxyCODONE-acetaminophen (PERCOCET) 5-325 mg per tablet Take  by mouth every four (4) hours as needed for Pain.     dexAMETHasone (DECADRON) 1 mg tablet TAKE 1 TABLET THREE TIMES A DAY FOR 3 DAYS, THEN 1 TABLET TWO TIMES A DAY FOR 3 DAYS, THEN 1 TABLET ONCE A DAY FOR 3 DAYS AND THEN STOP    metFORMIN (GLUCOPHAGE) 500 mg tablet Take  by mouth two (2) times daily (with meals).  hydroCHLOROthiazide (HYDRODIURIL) 25 mg tablet Take 1 Tab by mouth daily. No current facility-administered medications for this visit. PHYSICAL EXAMINATION:    There were no vitals taken for this visit. Patient-Reported Vitals 2/19/2021   Patient-Reported Weight 252lb        Due to this being a TeleHealth evaluation, many elements of the physical examination are unable to be assessed. Exam:  NEUROLOGICAL EXAM:  General: Awake, alert, oriented x 3  CN: EOMI, facial strength normal and symmetric, hearing is intact  Motor: AG x 4  Reflexes: deferred  Coordination: No ataxia. Sensation: LT intact throughout  Gait: Steady      LABS / IMAGING  Lab Results   Component Value Date/Time    WBC 8.8 01/25/2021 12:00 AM    Hemoglobin (POC) 11.3 02/06/2020 01:55 PM    HGB 10.8 (L) 01/25/2021 12:00 AM    HCT 35.0 01/25/2021 12:00 AM    PLATELET 455 55/50/2472 12:00 AM    MCV 79 01/25/2021 12:00 AM     Lab Results   Component Value Date/Time    Sodium 143 01/25/2021 12:00 AM    Potassium 4.0 01/25/2021 12:00 AM    Chloride 106 01/25/2021 12:00 AM    CO2 26 01/25/2021 12:00 AM    Anion gap 7 09/03/2020 02:18 AM    Glucose 87 01/25/2021 12:00 AM    BUN 12 01/25/2021 12:00 AM    Creatinine 0.65 01/25/2021 12:00 AM    BUN/Creatinine ratio 18 01/25/2021 12:00 AM    GFR est  01/25/2021 12:00 AM    GFR est non- 01/25/2021 12:00 AM    Calcium 9.2 01/25/2021 12:00 AM    Bilirubin, total <0.2 01/25/2021 12:00 AM    Alk.  phosphatase 101 01/25/2021 12:00 AM    Protein, total 6.7 01/25/2021 12:00 AM    Albumin 3.8 (L) 01/25/2021 12:00 AM    Globulin 5.1 (H) 09/03/2020 02:18 AM    A-G Ratio 1.3 01/25/2021 12:00 AM    ALT (SGPT) 11 01/25/2021 12:00 AM    AST (SGOT) 16 01/25/2021 12:00 AM       ASSESSMENT    ICD-10-CM ICD-9-CM    1.  Chronic migraine w/o aura w/o status migrainosus, not intractable  G43.709 346.70 erenumab-aooe (Aimovig Autoinjector) 140 mg/mL injection      gabapentin (NEURONTIN) 300 mg capsule       DISCUSSION  Ms. Latonya Sheriff has chronic migraines, chronic daily headaches with analgesic overuse/rebound component  She continues to use NSAIDs on a daily basis and she was advised to avoid using it more than once or twice a week  Emgality was helping her quite well but now it only helps for about a week  We will try to switch prophylactic to Aimovig 140 mg monthly  May increase gabapentin to 300 mg twice daily to help deal with analgesic withdrawal/rebound headache  Avoiding steroid pack because of underlying rheumatological work-up underway    Since she has been on multiple prophylactics including amitriptyline, propranolol, venlafaxine without benefit  Continue periodic follow-up    Clearance MD Kalyn  Diplomate, American Board of Psychiatry & Neurology (Neurology)  [place] Board of Psychiatry & Neurology (Clinical Neurophysiology)  Diplomate, American Board of Electrodiagnostic Medicine

## 2021-02-22 ENCOUNTER — TELEPHONE (OUTPATIENT)
Dept: NEUROLOGY | Age: 26
End: 2021-02-22

## 2021-02-22 NOTE — TELEPHONE ENCOUNTER
Re: Dinesh Raza approved    Approval rec'd from [employer] via [facility]    Effective 02/22/21-05/22/21  PA # [id number]    Pharmacy notified of approval via [facility].

## 2021-02-25 ENCOUNTER — HOSPITAL ENCOUNTER (OUTPATIENT)
Dept: PHYSICAL THERAPY | Age: 26
Discharge: HOME | End: 2021-02-25
Payer: MEDICAID

## 2021-02-25 PROCEDURE — 97110 THERAPEUTIC EXERCISES: CPT | Performed by: PHYSICAL THERAPIST

## 2021-02-25 PROCEDURE — 97530 THERAPEUTIC ACTIVITIES: CPT | Performed by: PHYSICAL THERAPIST

## 2021-02-25 NOTE — PROGRESS NOTES
PT Re-evaluation / DAILY TREATMENT NOTE - [facility] 2-15    Patient Name: Vinh Rothman  Date:2021  : 1995  [x]  Patient  Verified  Payor: Venkata Ferreira / Plan: [facility] [employer] / Product Type: Managed Care Medicaid /    In time:315  Out time: 405  Total Treatment Time (min): 50  Total Timed Codes (min): 50  1:1 Treatment Time ( only): 50   Visit #: 3    Treatment Area: Pain in right shoulder [M25.511]  Left shoulder pain [M25.512]  Lower back pain [M54.5]    SUBJECTIVE  Pain Level (0-10 scale), subjective functional status/changes: Pt reports /10. Pt reports she is still experiencing a lot of pain. She took a muscle relaxor before she can in today. She is now getting in and out of bed without her boyfriends help so that is  A little better. She pulls on her night stand and bed with her arms. Her ankle is still bothering her too. With supine to sit transfer \"I'm scared. \"          Any medication changes, allergies to medications, adverse drug reactions, diagnosis change, or new procedure performed?: [x] No    [] Yes (see summary sheet for update) SEE ABOVE. OBJECTIVE     Lumbar Active Movements:  ROM  AROM Comments:pain, area   Forward flexion  Fingers 4 inches superior to TF joint line  No reversal of lumbar curve as pt very guarded with motion, pt notes pain. Extension  WNL for age Pt notes inc. Pain. Seated Rotation right NT NT   Seated Rotation left NT NT   SB right Fingers 4 inches from TF joint line Increased pain   SB left Fingers 4 inches from TF joint line Increased pain.          Strength:       L(0-5) R (0-5) N/T   Hip Flexion (L1,2) 2 2 []?    Knee Extension (L3,4) 3 3 []?       []?       []?          []?          []?          []?          []?          []?          []?    Did not provide manual resistance due to severe pain, pt grimacing in pain.       Transfer: pt now able to transfer supine to sit but with very slow movement and grimacing throughout transfer.       Pt to ice at home. min Modality:      [x]  Ice     []  Heat       Position/location:   Low back and cervical spine x 10 ' pt seated. Rationale: decrease pain to improve the patients ability to do functional activities   [x] Skin assessment post-treatment:  [x]intact []redness- no adverse reaction    []redness - adverse reaction:      40 min Therapeutic Exercise:  [x] See flow sheet : 5 laps of FW ambulation in clinic, observed severe genu recurvatum and snapping into genu recurvatum. Pt also putting her hands in coat pockets. Pt cues for arm swing, heel to toe gait pattern and avoiding genu recurvatum / snapping and TrA contraction. Rationale: increase strength, improve coordination and increase proprioception to improve the patients ability to walk. 10 THER ACT. : supine to sit transfer x 3. Verbal and tactile cues for correct UE placement for log roll technique and pt pulling on head of bed with supine to sit - pt educated she should be pushing with UE off of treatment surface. Pt reports \"I'm scared. \"       - min Manual Therapy:  -   Rationale: decrease pain, increase tissue extensibility and decrease trigger points  to improve the patients ability to -          With   [] TE   [] TA   [] neuro   [] other: Patient Education: [x] Review HEP    [] Progressed/Changed HEP based on:   [] positioning   [] body mechanics   [] transfers   [] heat/ice application    [] other:        Pain Level (0-10 scale) post treatment: 6    ASSESSMENT/Changes in Function:   Pt with 3 skilled PT session from 01/28 through 02/25. Pt continues to be out of work and c/o of severe pain. She has poor tolerance for therapeutic exercises and reports \"I'm scared\" with performing supine to sit in session today. There seems to be fear avoidance throughout our 3 sessions. This is the first time she has been able to transfer supine to sit in PT session.   We are encouraging her to avoid sitting for prolonged periods and get up for a short walk when she can. She shows very slow progress and we have have limited sessions over this past month. We did ask her to RS her 2 appointments missed during last 2 weeks and she will f/u with referring MD next week. Patient will continue to benefit from skilled PT services to modify and progress therapeutic interventions, address functional mobility deficits, analyze and address soft tissue restrictions, assess and modify postural abnormalities and instruct in home and community integration to attain remaining goals. Short Term Goals: To be accomplished in 2-3 weeks:              1) Pt independent in HEP from day 1 MET                Long Term Goals: To be accomplished in 4-6 weeks:              1) Pt independent in final HEP. 2) Pt will be able to transfer sit to supine independently and without pain > 2/10.                 3) Pt will report she is able to bend over to feed her dogs food without pain > 2/10              4) Pt will be able to transfer sit to stand without pain > 2/10                      PLAN      []  Continue plan of care    [x]  Other:_  2 more visits at 1x/week , last re-eval 02/25 (today)    Mariama Gr, PT DPT, OCS 2/25/2021  6:12 PM       PT License #[id number]

## 2021-03-02 DIAGNOSIS — J45.909 MODERATE ASTHMA WITHOUT COMPLICATION, UNSPECIFIED WHETHER PERSISTENT: ICD-10-CM

## 2021-03-03 ENCOUNTER — OFFICE VISIT (OUTPATIENT)
Dept: RHEUMATOLOGY | Age: 26
End: 2021-03-03
Payer: MEDICAID

## 2021-03-03 VITALS
OXYGEN SATURATION: 98 % | SYSTOLIC BLOOD PRESSURE: 144 MMHG | RESPIRATION RATE: 20 BRPM | BODY MASS INDEX: 43.5 KG/M2 | HEART RATE: 108 BPM | DIASTOLIC BLOOD PRESSURE: 103 MMHG | TEMPERATURE: 97.3 F | HEIGHT: 64 IN | WEIGHT: 254.8 LBS

## 2021-03-03 DIAGNOSIS — R70.0 ELEVATED SED RATE: ICD-10-CM

## 2021-03-03 DIAGNOSIS — M15.9 PRIMARY OSTEOARTHRITIS INVOLVING MULTIPLE JOINTS: ICD-10-CM

## 2021-03-03 DIAGNOSIS — M62.830 BACK MUSCLE SPASM: ICD-10-CM

## 2021-03-03 DIAGNOSIS — M25.50 HYPERMOBILITY ARTHRALGIA: Primary | ICD-10-CM

## 2021-03-03 PROCEDURE — 99215 OFFICE O/P EST HI 40 MIN: CPT | Performed by: INTERNAL MEDICINE

## 2021-03-03 RX ORDER — ALBUTEROL SULFATE 0.83 MG/ML
SOLUTION RESPIRATORY (INHALATION)
Qty: 300 ML | Refills: 1 | Status: SHIPPED | OUTPATIENT
Start: 2021-03-03

## 2021-03-03 RX ORDER — TIZANIDINE 4 MG/1
4 TABLET ORAL
Qty: 60 TAB | Refills: 2 | Status: SHIPPED | OUTPATIENT
Start: 2021-03-03 | End: 2021-07-19

## 2021-03-03 RX ORDER — DULOXETIN HYDROCHLORIDE 20 MG/1
CAPSULE, DELAYED RELEASE ORAL
COMMUNITY
Start: 2021-03-01 | End: 2022-07-13

## 2021-03-03 NOTE — PROGRESS NOTES
REASON FOR VISIT:   Leena Zazueta is a 30 y.o. female with history of asthma who is returning for followup of recurrent back spasms on a background of hypermobility arthralgia. HISTORY OF PRESENT ILLNESS    Has missed couple of PT visits, following once a week currently. Currently flaring mostly in the low back. The tizanidine helped but she ran out. Feels she is having a hard time sleeping on the increased gabapentin dose, but few medications were adjusted at the time. Having hard time finding a Pain Specialist who accepts her insurance. New sacral pain. Old pelvic exercises which used to help now seem to worsen this. Not taking ibuprofen or naproxen. Disease History (1/2021 initial visit):  About two months ago left ankle started swelling painfully without precipitating injury or new medication, went to [facility] where xrays were done and diagnosed with tendonitis. Also seemed to get swelling in a band-like distribution in the low back, couldn't bend over. Was prescribed muscle relaxer, prednisone, and taken out of work. 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 Went back to [facility], told to stop the prednisone, which hadn't helped her joint pain. Continued cyclobenzaprine but didn't find this helpful. REVIEW OF SYSTEMS  A comprehensive review of systems was negative except for that written in the HPI. A 10-point review of systems is per the new patient questionnaire, which has been reviewed extensively and scanned into the electronic medical record for future reference. Review of systems is as above and is otherwise negative.     ALLERGIES  Latex and Witch hazel    MEDICATIONS  Current Outpatient Medications   Medication Sig    albuterol (PROVENTIL VENTOLIN) 2.5 mg /3 mL (0.083 %) nebu USE 1 VIAL VIA NEBULIZER EVERY 6 HOURS AS NEEDED FOR WHEEZING    DULoxetine (CYMBALTA) 20 mg capsule     erenumab-aooe (Aimovig Autoinjector) 140 mg/mL injection 1 mL by SubCUTAneous route every thirty (30) days.  gabapentin (NEURONTIN) 300 mg capsule Take 1 Cap by mouth two (2) times a day. Max Daily Amount: 600 mg. (Patient taking differently: Take 600 mg by mouth daily.)    ferrous sulfate 325 mg (65 mg iron) tablet Take 1 Tab by mouth two (2) times a day.  ibuprofen (MOTRIN) 800 mg tablet 800 mg.  clindamycin (CLINDAGEL) 1 % topical gel Apply  to affected area two (2) times a day. use thin film on affected area    amLODIPine (NORVASC) 5 mg tablet     diclofenac EC (VOLTAREN) 50 mg EC tablet TAKE 1 TABLET BY MOUTH 3 TIMES A DAY AS NEEDED FOR PAIN    tiZANidine (ZANAFLEX) 4 mg tablet Take 1 Tab by mouth every six (6) hours as needed for Muscle Spasm(s).  sertraline (ZOLOFT) 50 mg tablet TAKE 1 TABLET BY MOUTH EVERY DAY    propranoloL (INDERAL) 20 mg tablet TAKE 1 TABLET BY MOUTH TWICE A DAY    albuterol (PROVENTIL HFA, VENTOLIN HFA, PROAIR HFA) 90 mcg/actuation inhaler Take 1 Puff by inhalation every six (6) hours as needed for Wheezing.  Nebulizer & Compressor machine Albuterol    oxyCODONE-acetaminophen (PERCOCET) 5-325 mg per tablet Take  by mouth every four (4) hours as needed for Pain.  dexAMETHasone (DECADRON) 1 mg tablet TAKE 1 TABLET THREE TIMES A DAY FOR 3 DAYS, THEN 1 TABLET TWO TIMES A DAY FOR 3 DAYS, THEN 1 TABLET ONCE A DAY FOR 3 DAYS AND THEN STOP    LORazepam (ATIVAN) 0.5 mg tablet Take 1 Tab by mouth nightly as needed for Anxiety. Max Daily Amount: 0.5 mg.    prazosin (MINIPRESS) 2 mg capsule Take 2 mg by mouth nightly.  lisinopril (PRINIVIL, ZESTRIL) 20 mg tablet Take 1 Tab by mouth two (2) times a day.  metFORMIN (GLUCOPHAGE) 500 mg tablet Take  by mouth two (2) times daily (with meals).  hydroCHLOROthiazide (HYDRODIURIL) 25 mg tablet Take 1 Tab by mouth daily.     venlafaxine-SR (EFFEXOR-XR) 37.5 mg capsule 37.5 mg. No current facility-administered medications for this visit. PAST MEDICAL HISTORY  Past Medical History:   Diagnosis Date    Depression     has been on meds since 1/11    GERD (gastroesophageal reflux disease)     Psychiatric disorder     Seasonal allergies        FAMILY HISTORY  family history includes Arthritis-rheumatoid in her maternal grandmother and mother; Diabetes in her father and mother; Gout in her mother; Heart Disease in her father and mother; Hypertension in her father and mother; MS in her maternal grandmother. Mother hypermobile, maternal uncle hypermobile (). Maternal grandmother had RA and gout. Mother has RA and gout, and follows in this clinic. No family history of psoriasis. SOCIAL HISTORY  She  reports that she has been smoking. She has never used smokeless tobacco. She reports previous alcohol use. She reports previous drug use. Drug: Marijuana. Social History     Social History Narrative    Not on file   CNA, cuts hair  Resumed smoking, <1pack/week      DATA  Visit Vitals  BP (!) 144/103 (BP 1 Location: Left upper arm, BP Patient Position: Sitting, BP Cuff Size: Large adult)   Pulse (!) 108   Temp 97.3 °F (36.3 °C) (Oral)   Resp 20   Ht 5' 4\" (1.626 m)   Wt 254 lb 12.8 oz (115.6 kg)   LMP 02/26/2021   SpO2 98%   BMI 43.74 kg/m²    Body mass index is 43.74 kg/m². No flowsheet data found. General:  The patient is pleasant, morbidly obese, alert, and in no apparent distress. Eyes: Sclera are anicteric. No conjunctival injection. HEENT:  Oropharynx is clear. No oral ulcers. Adequate salivary pooling. No cervical or supraclavicular lymphadenopathy. Lungs:  Clear to auscultation bilaterally, without wheeze or stridor. Normal respiratory effort. Cor:  Regular rate and rhythm. No murmur rub or gallop. Abdomen: Soft, non-tender, without hepatomegaly or masses. Extremities: No calf tenderness or edema. Warm and well perfused.    Skin:  No significant abnormalities. No petechial, purpuric, or psoriaform rashes. No erythema nodosum. Scattered tattoos without nodularity or clearing. Neuro: Nonfocal, negative seated SLR bilaterally. Musculoskeletal:    A comprehensive musculoskeletal exam was performed for all joints of each upper and lower extremity and assessed for swelling, tenderness and range of motion. Results are documented as below:  Beighton +knees, MCPs, thumbs (6/9)  Bilateral trapezius spasm. Leftward cervical rotation limited to ~30deg from midline, right to 45deg. Bilateral AC tenderness, active shoulder abduction limited to 80deg. Left > right paralumbar tenderness. Right lateral ankle tenderness without emi effusion. No evidence of synovitis in the small joints of the hands, wrists, shoulders, elbows, hips, knees or ankles. Labs:  21: WBC 8.8, Hgb 10.8 (MCV 79), Plt 259; ESR 49; Cr 0.69; LFTs WNL; RF <10, CCP neg; CRP 13mg/L  20: HIV neg, RPR neg, Hep C Ab neg, Hep B SAg neg    Imagin21: CXR, hand xrays, and foot xrays: Reviewed personally, on my interpretation no erosive changes or chondrocalcinosis; calcific enthesopathy of right achilles  9/3/20 CT abd/pelvis: 1. Bilateral nonobstructive nephrolithiasis. No hydronephrosis. 2. No evidence of acute process. ASSESSMENT AND PLAN  Ms. Cassie Sullivan is a 30 y.o. female who presents for evaluation of degenerative character arthralgia with cervicalgia and trapezius spasm likely predisposed by combination of habitus and hypermobility. She has mildly elevated acute phase reactants which are also most likely habitus-related, but will complete inflammatory arthritis workup. If inflammatory arthritis workup is unremarkable, will rely on Pain Management and her PCP to continue managing mechanical pain. 1. Primary osteoarthritis involving multiple joints  - tiZANidine (ZANAFLEX) 4 mg tablet; Take 1 Tab by mouth every six (6) hours as needed for Muscle Spasm(s).   Dispense: 60 Tab; Refill: 2  - CK; Future  - ANTI-NUCLEAR AB BY IFA (RDL); Future  - REFERRAL TO PAIN MANAGEMENT; Future  - CK  - ANTI-NUCLEAR AB BY IFA (RDL)    2. Hypermobility arthralgia  - CK; Future  - ANTI-NUCLEAR AB BY IFA (RDL); Future  - REFERRAL TO PAIN MANAGEMENT; Future  - CK  - ANTI-NUCLEAR AB BY IFA (RDL)    3. Back muscle spasm  - CK; Future  - ANTI-NUCLEAR AB BY IFA (RDL); Future  - REFERRAL TO PAIN MANAGEMENT; Future  - CK  - ANTI-NUCLEAR AB BY IFA (RDL)    4. Elevated sed rate  - CK; Future  - ANTI-NUCLEAR AB BY IFA (RDL); Future  - REFERRAL TO PAIN MANAGEMENT; Future  - CK  - ANTI-NUCLEAR AB BY IFA (RDL)      Patient Instructions   1. I'm ordering a few more blood tests for autoimmune conditions since your inflammatory markers were high, but I suspect most of your pain now is coming from a combination of loose joints with your extra weight predisposing to back spasms. Keep looking for Pain Management who can see you, since you may benefit from back injections. 2. Continue physical therapy. Chiropractic can be helpful as well. 3. I'm renewing tizanidine as needed for back spasms. 4. Labs from [facility] me a message once you get this done, and we can go over the results together. 5. Keep working on weight loss. Small weight loss can make a big difference in back and knee pains. 6. Return for abnormalities in the above, or new issues.       Orders Placed This Encounter    CK    ANTI-NUCLEAR AB BY IFA (RDL)    REFERRAL TO PAIN MANAGEMENT    DULoxetine (CYMBALTA) 20 mg capsule    tiZANidine (ZANAFLEX) 4 mg tablet     Face to face time: 30 minutes  Note preparation and records review day of service: 10 minutes  Total provider time day of service: 40 minutes      Medications: I am having Edrie Jobs maintain her metFORMIN, hydroCHLOROthiazide, prazosin, lisinopriL, LORazepam, oxyCODONE-acetaminophen, dexAMETHasone, albuterol, Nebulizer & Compressor, sertraline, propranoloL, ibuprofen, clindamycin, venlafaxine-SR, amLODIPine, diclofenac EC, ferrous sulfate, Aimovig Autoinjector, gabapentin, albuterol, DULoxetine, and tiZANidine. Follow up: Return if symptoms worsen or fail to improve.     Jose Enrique Clay MD    Adult Rheumatology   [facility]  A Part of [facility], [address]   Phone [phone]  Fax [phone]

## 2021-03-03 NOTE — PATIENT INSTRUCTIONS
1. I'm ordering a few more blood tests for autoimmune conditions since your inflammatory markers were high, but I suspect most of your pain now is coming from a combination of loose joints with your extra weight predisposing to back spasms. Keep looking for Pain Management who can see you, since you may benefit from back injections. 2. Continue physical therapy. Chiropractic can be helpful as well. 3. I'm renewing tizanidine as needed for back spasms. 4. Labs from [facility] me a message once you get this done, and we can go over the results together. 5. Keep working on weight loss. Small weight loss can make a big difference in back and knee pains. 6. Return for abnormalities in the above, or new issues.

## 2021-03-03 NOTE — PROGRESS NOTES
Chief Complaint   Patient presents with    Joint Pain     hips, back     1. Have you been to the ER, urgent care clinic since your last visit? Hospitalized since your last visit? No    2. Have you seen or consulted any other health care providers outside of the [address] [place] [address] since your last visit? Include any pap smears or colon screening.  Yes Where: PCP

## 2021-03-04 ENCOUNTER — APPOINTMENT (OUTPATIENT)
Dept: PHYSICAL THERAPY | Age: 26
End: 2021-03-04

## 2021-03-23 ENCOUNTER — TELEPHONE (OUTPATIENT)
Dept: SURGERY | Age: 26
End: 2021-03-23

## 2021-03-23 NOTE — TELEPHONE ENCOUNTER
Called pt regarding Bariatric Referral from Erick Mosquera MD to Dr Karyn Samano. Pt interested. Will email everything to complete. Emailed: [email or web address] as requested to complete forms and seminar. Will call Friday to follow up.

## 2021-04-07 ENCOUNTER — TELEPHONE (OUTPATIENT)
Dept: RHEUMATOLOGY | Age: 26
End: 2021-04-07

## 2021-04-07 NOTE — TELEPHONE ENCOUNTER
Called, no answer, left  requesting a callback if she has any particular concerns from a Rheum standpoing. Shingles outbreak is unfortunate but not a sign of autoimmune disease; if she needs stronger pain control she should be able to coordinate with Dr. Kirill Malhotra, if she has still been unable to find a Pain Management provider. We would still like her to get AROLDO and CPK done when she can, so her autoimmune workup is complete.

## 2021-04-07 NOTE — TELEPHONE ENCOUNTER
----- Message from Isai Georges RN sent at 4/5/2021  3:45 PM EDT -----  Regarding: FW: VARINDER/TELEPHONE  Contact: [phone]    ----- Message -----  From: Kojo Staff  Sent: 4/5/2021   3:35 PM EDT  To: [facility] Nurse Pool  Subject: VARINDER/TELEPHONE                               General Message/Vendor Calls    Caller's first and last name: Jeremie Pineda      Reason for call: Would like a callback from Dr Eason Ser required yes/no and why: Yes      Best contact number(s): [phone]      Details to clarify the request: Patient calling to advise Dr. Elis Molina that he lost usage in the left leg and fell and [facility]. The leg has swollen more since her last visit. Also she was. Boby Moss and was diagnosed with shingles. Per patient she has not been able to have her labs drawn or see the physical therapist due to car problems. Please call to advise.         Susie Davis

## 2021-04-08 ENCOUNTER — TELEPHONE (OUTPATIENT)
Dept: PRIMARY CARE CLINIC | Age: 26
End: 2021-04-08

## 2021-04-08 NOTE — TELEPHONE ENCOUNTER
----- Message from Vida Lock sent at 4/7/2021  4:56 PM EDT -----  Regarding: Dr Judith Kahn Message/Vendor Calls    Caller's first and last name:pt      Reason for call:shingles relief      Callback required yes/no and why: yes to discuss      Best contact number(s):[phone]       Details to clarify the request: Pt went to ER and was diagnosed with shingles. Neuro told her that she should not take ibuprofen anymore so she is taking tylenol but she has used all of the cream that the ER prescribed her. She would like to discuss getting more cream and any other advise to help her out.       Vida Lock

## 2021-04-12 ENCOUNTER — VIRTUAL VISIT (OUTPATIENT)
Dept: PRIMARY CARE CLINIC | Age: 26
End: 2021-04-12
Payer: MEDICAID

## 2021-04-12 DIAGNOSIS — B02.9 HERPES ZOSTER WITHOUT COMPLICATION: ICD-10-CM

## 2021-04-12 DIAGNOSIS — B35.6 TINEA CRURIS: Primary | ICD-10-CM

## 2021-04-12 PROCEDURE — 99213 OFFICE O/P EST LOW 20 MIN: CPT | Performed by: INTERNAL MEDICINE

## 2021-04-12 RX ORDER — FLUCONAZOLE 200 MG/1
200 TABLET ORAL DAILY
Qty: 7 TAB | Refills: 0 | Status: SHIPPED | OUTPATIENT
Start: 2021-04-12 | End: 2021-04-19

## 2021-04-12 RX ORDER — HYDROXYZINE HYDROCHLORIDE 10 MG/1
10 TABLET, FILM COATED ORAL
Qty: 21 TAB | Refills: 0 | Status: SHIPPED | OUTPATIENT
Start: 2021-04-12 | End: 2021-04-22

## 2021-04-12 NOTE — PROGRESS NOTES
Nataly Renteria is a 30 y.o. female who was seen by synchronous (real-time) audio-video technology on 4/12/2021 for Shingles        Assessment & Plan:   Diagnoses and all orders for this visit:    1. Tinea cruris  -     hydrOXYzine HCL (ATARAX) 10 mg tablet; Take 1 Tab by mouth three (3) times daily as needed (prn) for up to 10 days. -     fluconazole (DIFLUCAN) 200 mg tablet; Take 1 Tab by mouth daily for 7 days. FDA advises cautious prescribing of oral fluconazole in pregnancy. 2. Herpes zoster without complication      This was a virtual visit and I saw the rash through a televisit. It appears to be tinea corporis and not shingles. We will empirically treat her for tinea corporis. I spent at least 20 minutes on this visit with this established patient. Subjective:       Prior to Admission medications    Medication Sig Start Date End Date Taking? Authorizing Provider   albuterol (PROVENTIL VENTOLIN) 2.5 mg /3 mL (0.083 %) nebu USE 1 VIAL VIA NEBULIZER EVERY 6 HOURS AS NEEDED FOR WHEEZING 3/3/21   Mary Conley MD   DULoxetine (CYMBALTA) 20 mg capsule  3/1/21   Provider, Historical   tiZANidine (ZANAFLEX) 4 mg tablet Take 1 Tab by mouth every six (6) hours as needed for Muscle Spasm(s). 3/3/21   Grace Love MD   erenumab-aooe (Aimovig Autoinjector) 140 mg/mL injection 1 mL by SubCUTAneous route every thirty (30) days. 2/19/21   Benjamin Saucedo MD   gabapentin (NEURONTIN) 300 mg capsule Take 1 Cap by mouth two (2) times a day. Max Daily Amount: 600 mg. Patient taking differently: Take 600 mg by mouth daily. 2/19/21   Benjamin Saucedo MD   ferrous sulfate 325 mg (65 mg iron) tablet Take 1 Tab by mouth two (2) times a day. 2/15/21   Mary Conley MD   ibuprofen (MOTRIN) 800 mg tablet 800 mg. Provider, Historical   clindamycin (CLINDAGEL) 1 % topical gel Apply  to affected area two (2) times a day.  use thin film on affected area    Provider, Historical   venlafaxine-[facility] 37.5 mg capsule 37.5 mg.    Provider, Historical   amLODIPine (NORVASC) 5 mg tablet  1/17/21   Provider, Historical   diclofenac EC (VOLTAREN) 50 mg EC tablet TAKE 1 TABLET BY MOUTH 3 TIMES A DAY AS NEEDED FOR PAIN 1/12/21   Provider, Historical   sertraline (ZOLOFT) 50 mg tablet TAKE 1 TABLET BY MOUTH EVERY DAY 1/11/21   Moris Ruggiero MD   propranoloL (INDERAL) 20 mg tablet TAKE 1 TABLET BY MOUTH TWICE A DAY 1/11/21   Lashonda DOZIER MD   albuterol (PROVENTIL HFA, VENTOLIN HFA, PROAIR HFA) 90 mcg/actuation inhaler Take 1 Puff by inhalation every six (6) hours as needed for Wheezing. 10/1/20   Moris Ruggiero MD   Nebulizer & Compressor machine Albuterol 10/1/20   Moris Ruggiero MD   oxyCODONE-acetaminophen (PERCOCET) 5-325 mg per tablet Take  by mouth every four (4) hours as needed for Pain. Provider, Historical   dexAMETHasone (DECADRON) 1 mg tablet TAKE 1 TABLET THREE TIMES A DAY FOR 3 DAYS, THEN 1 TABLET TWO TIMES A DAY FOR 3 DAYS, THEN 1 TABLET ONCE A DAY FOR 3 DAYS AND THEN STOP 8/27/20   Renu Vu MD   LORazepam (ATIVAN) 0.5 mg tablet Take 1 Tab by mouth nightly as needed for Anxiety. Max Daily Amount: 0.5 mg. 8/18/20   Moris Ruggiero MD   prazosin (MINIPRESS) 2 mg capsule Take 2 mg by mouth nightly. Provider, Historical   lisinopril (PRINIVIL, ZESTRIL) 20 mg tablet Take 1 Tab by mouth two (2) times a day. 2/6/20   Moris Ruggiero MD   metFORMIN (GLUCOPHAGE) 500 mg tablet Take  by mouth two (2) times daily (with meals). Provider, Historical   hydroCHLOROthiazide (HYDRODIURIL) 25 mg tablet Take 1 Tab by mouth daily. 10/17/19   Moris Ruggiero MD     History  Patient reported that she was seen at [facility]  doctors ER for rash on her chest and was diagnosed with shingles. She had a lot of itching and that led to scratching and subsequent pain related to scratch marks. She has been taking valacyclovir but does not feel any difference.   She continues to itch on the chest on both sides of her chest anteriorly. ROS    Objective:     Patient-Reported Vitals 2/19/2021   Patient-Reported Weight 252lb        [INSTRUCTIONS:  \"[x]\" Indicates a positive item  \"[]\" Indicates a negative item  -- DELETE ALL ITEMS NOT EXAMINED]    Constitutional: [x] Appears well-developed and well-nourished [x] No apparent distress      [] Abnormal -     Mental status: [x] Alert and awake  [x] Oriented to person/place/time [x] Able to follow commands    [] Abnormal -     Eyes:   EOM    [x]  Normal    [] Abnormal -   Sclera  [x]  Normal    [] Abnormal -          Discharge [x]  None visible   [] Abnormal -     HENT: [x] Normocephalic, atraumatic  [] Abnormal -   [x] Mouth/Throat: Mucous membranes are moist    External Ears [x] Normal  [] Abnormal -    Neck: [x] No visualized mass [] Abnormal -     Pulmonary/Chest: [x] Respiratory effort normal   [x] No visualized signs of difficulty breathing or respiratory distress        [] Abnormal -      Musculoskeletal:   [x] Normal gait with no signs of ataxia         [x] Normal range of motion of neck        [] Abnormal -     Neurological:        [x] No Facial Asymmetry (Cranial nerve 7 motor function) (limited exam due to video visit)          [x] No gaze palsy        [] Abnormal -          Skin:        [x] No significant exanthematous lesions or discoloration noted on facial skin         [] Abnormal -tinea corporis on the anterior chest wall. Psychiatric:       [x] Normal Affect [] Abnormal -        [x] No Hallucinations    Other pertinent observable physical exam findings:-        We discussed the expected course, resolution and complications of the diagnosis(es) in detail. Medication risks, benefits, costs, interactions, and alternatives were discussed as indicated. I advised her to contact the office if her condition worsens, changes or fails to improve as anticipated. She expressed understanding with the diagnosis(es) and plan. Joyce Vallecillo was evaluated through a synchronous (real-time) audio-video encounter. The patient (or guardian if applicable) is aware that this is a billable service. Verbal consent to proceed has been obtained within the past 12 months. The visit was conducted pursuant to the emergency declaration under the [address] [place] [address] and the [employer] and [employer] Act. Patient identification was verified, and a caregiver was present when appropriate. The patient was located in a state where the provider was credentialed to provide care.       Maria Teresa Valdes MD

## 2021-04-19 ENCOUNTER — TELEPHONE (OUTPATIENT)
Dept: SURGERY | Age: 26
End: 2021-04-19

## 2021-04-27 ENCOUNTER — TELEPHONE (OUTPATIENT)
Dept: NEUROLOGY | Age: 26
End: 2021-04-27

## 2021-05-17 ENCOUNTER — VIRTUAL VISIT (OUTPATIENT)
Dept: SURGERY | Age: 26
End: 2021-05-17
Payer: MEDICAID

## 2021-05-17 DIAGNOSIS — E66.01 MORBID OBESITY (HCC): Primary | ICD-10-CM

## 2021-05-17 PROCEDURE — 99204 OFFICE O/P NEW MOD 45 MIN: CPT | Performed by: SURGERY

## 2021-05-17 NOTE — PROGRESS NOTES
Bariatric Surgery Consultation    CC: Obesity with complications  Subjective: The patient is a 30 y.o. obese  female with a There is no height or weight on file to calculate BMI. The patient has been overweight since childhood with the highest weight being current weight. They have been considering surgery for some time now. The patient presents to the clinic today to discuss surgical weight loss options. They have made multiple attempts at weight loss over the years without success. They have tried diet and exercise. Unfortunately, none of these have lead to meaningful, sustained weight loss. The patient now suffers from multiple medical conditions related to their obesity including PCOS, hypertension, depression, sleep apnea, and joint pain. Relevant previous surgical history includes: Ovarian cyst removal. They have attended the bariatric seminar before the visit. The patient's goals for the surgery include lose weight and control her PCOS. The patient is here for visit today after prompting by her PCP and other specialists. She does not appear to have done much independent research regarding this process. Tobacco use: Marijuana and tobacco use  GERD/hiatal hernia: History of GERD as a child but no longer. Sleep Apnea assessment: She has tested positive for sleep apnea and is going to have this worked up by her PCP tomorrow    Consent:  The patient and/or their healthcare decision maker is aware that this patient-initiated Telehealth encounter is a billable service, with coverage as determined by the patient's insurance carrier. They are aware that they may receive a bill and has provided verbal consent to proceed: Yes     This virtual visit was conducted via [employer].   Pursuant to the emergency declaration under the [facility], [address] [place] [address] authority and the [employer] Act, this Virtual  Visit was conducted to reduce the patient's risk of exposure to COVID-19 and provide continuity of care for an established patient. Services were provided through a video synchronous discussion virtually to substitute for in-person clinic visit. Due to this being a TeleHealth evaluation, many elements of the physical examination are unable to be assessed. Patient Active Problem List    Diagnosis Date Noted    Obesity, morbid ([facility] [address] 01/30/2020    Essential hypertension 01/30/2020      Past Medical History:   Diagnosis Date    Asthma     Depression     has been on meds since 1/11    GERD (gastroesophageal reflux disease)     Headache     Hypertension     Morbid obesity ([facility] [address]     Musculoskeletal disorder     Psychiatric disorder     Seasonal allergies      Past Surgical History:   Procedure Laterality Date    HX ADENOIDECTOMY      HX DILATION AND CURETTAGE      HX GYN  02/2020    ovarian cyst removal     HX HEENT      jaw surgery    HX TONSILLECTOMY        Social History     Tobacco Use    Smoking status: Current Every Day Smoker    Smokeless tobacco: Never Used    Tobacco comment: black   Substance Use Topics    Alcohol use: Not Currently      Family History   Problem Relation Age of Onset    Hypertension Mother     Diabetes Mother     Heart Disease Mother    Trotter Arthritis-rheumatoid Mother     Gout Mother     Depression Mother     Diabetes Father     Hypertension Father     Heart Disease Father     Depression Father     Arthritis-rheumatoid Maternal Grandmother     MS Maternal Grandmother       Prior to Admission medications    Medication Sig Start Date End Date Taking?  Authorizing Provider   lisinopriL (PRINIVIL, ZESTRIL) 20 mg tablet TAKE 1 TABLET BY MOUTH TWICE A DAY 5/10/21  Yes Leslie Whitman MD   propranoloL (INDERAL) 20 mg tablet TAKE 1 TABLET BY MOUTH TWICE A DAY 5/10/21  Yes Leslie Whitman MD   albuterol (PROVENTIL VENTOLIN) 2.5 mg /3 mL (0.083 %) nebu USE 1 VIAL VIA NEBULIZER EVERY 6 HOURS AS NEEDED FOR WHEEZING 3/3/21  Yes Shayla Alarcon MD   DULoxetine (CYMBALTA) 20 mg capsule  3/1/21  Yes Provider, Historical   tiZANidine (ZANAFLEX) 4 mg tablet Take 1 Tab by mouth every six (6) hours as needed for Muscle Spasm(s). 3/3/21  Yes Toñito Borja MD   erenumab-aooe (Aimovig Autoinjector) 140 mg/mL injection 1 mL by SubCUTAneous route every thirty (30) days. 2/19/21  Yes Tan Azevedo MD   gabapentin (NEURONTIN) 300 mg capsule Take 1 Cap by mouth two (2) times a day. Max Daily Amount: 600 mg. Patient taking differently: Take 600 mg by mouth daily. 2/19/21  Yes Tan Azevedo MD   ferrous sulfate 325 mg (65 mg iron) tablet Take 1 Tab by mouth two (2) times a day. 2/15/21  Yes Shayla Alarcon MD   clindamycin (CLINDAGEL) 1 % topical gel Apply  to affected area two (2) times a day. use thin film on affected area   Yes Provider, Historical   amLODIPine (NORVASC) 5 mg tablet  1/17/21  Yes Provider, Historical   albuterol (PROVENTIL HFA, VENTOLIN HFA, PROAIR HFA) 90 mcg/actuation inhaler Take 1 Puff by inhalation every six (6) hours as needed for Wheezing. 10/1/20  Yes Shayla Alarcon MD   Nebulizer & Compressor machine Albuterol 10/1/20  Yes Shayla Alarcon MD   ibuprofen (MOTRIN) 800 mg tablet 800 mg. Provider, Historical   venlafaxine-SR (EFFEXOR-XR) 37.5 mg capsule 37.5 mg.    Provider, Historical   diclofenac EC (VOLTAREN) 50 mg EC tablet TAKE 1 TABLET BY MOUTH 3 TIMES A DAY AS NEEDED FOR PAIN 1/12/21   Provider, Historical   sertraline (ZOLOFT) 50 mg tablet TAKE 1 TABLET BY MOUTH EVERY DAY 1/11/21   Shayla Alarcon MD   oxyCODONE-acetaminophen (PERCOCET) 5-325 mg per tablet Take  by mouth every four (4) hours as needed for Pain.     Provider, Historical   dexAMETHasone (DECADRON) 1 mg tablet TAKE 1 TABLET THREE TIMES A DAY FOR 3 DAYS, THEN 1 TABLET TWO TIMES A DAY FOR 3 DAYS, THEN 1 TABLET ONCE A DAY FOR 3 DAYS AND THEN STOP 8/27/20   Horace Angeline Lin MD   LORazepam (ATIVAN) 0.5 mg tablet Take 1 Tab by mouth nightly as needed for Anxiety. Max Daily Amount: 0.5 mg. 8/18/20   Mary Conley MD   prazosin (MINIPRESS) 2 mg capsule Take 2 mg by mouth nightly. Provider, Historical   metFORMIN (GLUCOPHAGE) 500 mg tablet Take  by mouth two (2) times daily (with meals). Provider, Historical   hydroCHLOROthiazide (HYDRODIURIL) 25 mg tablet Take 1 Tab by mouth daily. 10/17/19   Mary Conley MD     Allergies   Allergen Reactions    Latex Rash    Witch Hazel Other (comments)        Review of Systems:    Constitutional: No fever or chills  Neurologic: No headache  Eyes: No scleral icterus or irritated eyes  Nose: No nasal pain or drainage  Mouth: No oral lesions or sore throat  Cardiac: No palpations or chest pain  Pulmonary: No cough or shortness or breath  Gastrointestinal: No nausea, emesis, diarrhea, or constipation  Genitourinary: No dysuria  Musculoskeletal: Joint pain  Skin: No rashes or lesions  Psychiatric: No anxiety or depressed mood      Objective:     Physical Exam:  There were no vitals taken for this visit. General: No acute distress, conversant  Neck: Trachea midline  Pulmonary: Symmetric chest rise with normal effort  GI: Obese  Psych: Appropriate mood and affect    Assessment:     Morbid obesity with comorbidities  Plan:   The patient presents today with multiple complications relating to their obesity as detailed above. The patient has made multiple unsuccessful attempts at weight loss as detailed above. The patient desires surgical weight loss for a better chance of lifelong weight control and control of co-morbidities. They have attended the bariatric seminar and meet the qualifications for surgery based on the NIH criteria. We have discussed the various surgical options including the laparoscopic sleeve gastrectomy and gastric bypass. We also discussed none operative alternatives.  The patient is currently interested in the gastric bypass. They will need to a/an UGI to evaluate their anatomy pre operatively. H pylori antigen ordered as well. Patient was encouraged to stop smoking immediately as she will have to be off tobacco for 3 months before surgery. I also encouraged her to quit marijuana. I do have significant concerns about the patient's buy in to  the program.  When I asked the patient why now she said that she was referred by her PCP and specialist regarding this procedure to help combat her multiple medical issues. When asked what procedure she wants she did not know about the various options. I wonder how much she actually watch the seminar. I am concerned about her commitment to this process and long-term commitment to the program.  She is also on steroids currently for unknown etiology of rash. This should not be a long-term issue for her. We have discussed the possible complications of bariatric surgery which include but are not limited to failed weight loss, weight regain, malnutrition, leak, bleed, stricture, gastric ulcer, gastric fistula, gastric bleed, gallstones, new or worsening gastric reflux, nausea, emesis, internal hernia, abdominal wall hernia, gastric perforation, need for revision / conversion / or reversal, pregnancy complications and loss, intestinal ischemia, post operative skin complications, possible thinning of their hair, bowel obstruction, dumping syndrome, wound infection, blood clots (DVT, mesenteric thrombus, pulmonary embolism), increased addictive tendency, risk of anesthesia, and death. The patient understands this is a life altering decision and will require compliance to the program for the remainder of their life in order to be monitored to avoid complication and ensure successful, sustained weight control.  They will be placed on a lifelong low carbohydrate and low sugar diet, exercise, and vitamin regimen and will require frequent blood draws and office visits to ensure adequate nutrition and program compliance. Visits and follow up will be in compliance with the guidelines set forth by [facility]. I have specifically mentioned the need to avoid all personal and second-hand tobacco exposure, systemic steroids, and NSAIDS after any bariatric surgery to help avoid the above listed complications. The patient has expressed understanding of the above and would like to enroll in the program. The patient will be submitted for medical and psychological clearance along with establishing with out dietician and joining the pre / post operative support group. They will be screened for depression and sleep apnea and treated pre operatively if needed. The patient will have to demonstrate cessation of tobacco if relevant for at least 3 months preoperatively along with having a controlled HbA1c less than 8. After successful completion of the preoperative regimen the patient will be submitted for insurance approval and pending this will be scheduled for surgery. All questions from the patient have been answered and they have demonstrated appropriate understanding of the process. Existing past medical problems are listed below along with how they are or will be addressed:    Patient Active Problem List    Diagnosis Date Noted    Obesity, morbid ([address] 01/30/2020    Essential hypertension 01/30/2020       Total time involved with this patient's care was: 45 minutes. This involved reviewing patient record, talking with patient, and charting on patient.     Signed By: Governmt Oviedo MD  Bariatric and General Surgeon  [facility]    May 17, 2021

## 2021-05-17 NOTE — PROGRESS NOTES
1. Have you been to the ER, urgent care clinic since your last visit? Hospitalized since your last visit? [facility] allergic reactions. See PCP tomorrow. 2. Have you seen or consulted any other health care providers outside of the [address] [place] [address] since your last visit? Include any pap smears or colon screening.  [facility].

## 2021-05-18 ENCOUNTER — OFFICE VISIT (OUTPATIENT)
Dept: PRIMARY CARE CLINIC | Age: 26
End: 2021-05-18
Payer: MEDICAID

## 2021-05-18 VITALS
OXYGEN SATURATION: 97 % | HEART RATE: 103 BPM | BODY MASS INDEX: 45.34 KG/M2 | DIASTOLIC BLOOD PRESSURE: 133 MMHG | WEIGHT: 265.6 LBS | SYSTOLIC BLOOD PRESSURE: 162 MMHG | TEMPERATURE: 98 F | HEIGHT: 64 IN | RESPIRATION RATE: 17 BRPM

## 2021-05-18 DIAGNOSIS — G43.009 MIGRAINE WITHOUT AURA AND WITHOUT STATUS MIGRAINOSUS, NOT INTRACTABLE: ICD-10-CM

## 2021-05-18 DIAGNOSIS — G89.29 CHRONIC MIDLINE LOW BACK PAIN, UNSPECIFIED WHETHER SCIATICA PRESENT: ICD-10-CM

## 2021-05-18 DIAGNOSIS — I10 ESSENTIAL HYPERTENSION: Primary | ICD-10-CM

## 2021-05-18 DIAGNOSIS — D64.9 ANEMIA, UNSPECIFIED TYPE: ICD-10-CM

## 2021-05-18 DIAGNOSIS — B35.6 TINEA CRURIS: ICD-10-CM

## 2021-05-18 DIAGNOSIS — R06.83 SNORING: ICD-10-CM

## 2021-05-18 DIAGNOSIS — N20.0 KIDNEY STONE: ICD-10-CM

## 2021-05-18 DIAGNOSIS — M54.50 CHRONIC MIDLINE LOW BACK PAIN, UNSPECIFIED WHETHER SCIATICA PRESENT: ICD-10-CM

## 2021-05-18 PROCEDURE — 99214 OFFICE O/P EST MOD 30 MIN: CPT | Performed by: INTERNAL MEDICINE

## 2021-05-18 RX ORDER — PROPRANOLOL HYDROCHLORIDE 20 MG/1
TABLET ORAL
Qty: 60 TAB | Refills: 0 | Status: CANCELLED | OUTPATIENT
Start: 2021-05-18

## 2021-05-18 RX ORDER — TRAZODONE HYDROCHLORIDE 50 MG/1
TABLET ORAL
COMMUNITY
Start: 2021-05-03 | End: 2022-07-13

## 2021-05-18 RX ORDER — MICONAZOLE NITRATE 2 %
POWDER (GRAM) TOPICAL 2 TIMES DAILY
Qty: 85 G | Refills: 3 | Status: SHIPPED | OUTPATIENT
Start: 2021-05-18

## 2021-05-18 RX ORDER — LANOLIN ALCOHOL/MO/W.PET/CERES
325 CREAM (GRAM) TOPICAL 2 TIMES DAILY
Qty: 60 TAB | Refills: 3 | Status: SHIPPED | OUTPATIENT
Start: 2021-05-18

## 2021-05-18 RX ORDER — CLINDAMYCIN PHOSPHATE 10 MG/G
GEL TOPICAL 2 TIMES DAILY
Qty: 1 ML | Status: CANCELLED | OUTPATIENT
Start: 2021-05-18

## 2021-05-18 RX ORDER — PROPRANOLOL HYDROCHLORIDE 60 MG/1
60 CAPSULE, EXTENDED RELEASE ORAL DAILY
Qty: 30 CAP | Refills: 3 | Status: SHIPPED | OUTPATIENT
Start: 2021-05-18 | End: 2022-07-13 | Stop reason: SDUPTHER

## 2021-05-18 RX ORDER — LISINOPRIL 20 MG/1
TABLET ORAL
Qty: 60 TAB | Refills: 0 | Status: SHIPPED | OUTPATIENT
Start: 2021-05-18

## 2021-05-18 RX ORDER — AMLODIPINE BESYLATE 5 MG/1
5 TABLET ORAL
Qty: 30 TAB | Refills: 3 | Status: SHIPPED | OUTPATIENT
Start: 2021-05-18

## 2021-05-18 NOTE — PROGRESS NOTES
Room:     Identified pt with two pt identifiers(name and ). Reviewed record in preparation for visit and have obtained necessary documentation. All patient medications has been reviewed. Chief Complaint   Patient presents with    Medication Refill    Rash     [facility] and was told she had allergic reaction and was put on steriods. Health Maintenance Due   Topic    Hepatitis C Screening     Pneumococcal 0-64 years (1 of 1 - PPSV23)    COVID-19 Vaccine (1)    DTaP/Tdap/Td series (1 - Tdap)    PAP AKA CERVICAL CYTOLOGY        Vitals:    21 1557 21 1610   BP: (!) 168/112 (!) 162/133   Pulse: (!) 103    Resp: 17    Temp: 98 °F (36.7 °C)    TempSrc: Oral    SpO2: 97%    Weight: 265 lb 9.6 oz (120.5 kg)    Height: 5' 4\" (1.626 m)    PainSc:   8    LMP: 2021       4. Have you been to the ER, urgent care clinic since your last visit? Hospitalized since your last visit? No    5. Have you seen or consulted any other health care providers outside of the [address] [place] [address] since your last visit? Include any pap smears or colon screening. No        Patient is accompanied by self I have received verbal consent from [place]  to discuss any/all medical information while they are present in the room.

## 2021-05-20 NOTE — PROGRESS NOTES
Joyce Vallecillo is a 30 y.o.  female and presents with     Chief Complaint   Patient presents with    Medication Refill    Rash     [facility] and was told she had allergic reaction and was put on steriods.  Hypertension    Joint Pain    Anemia     Patient reports that she ran out of blood pressure medication. He also needs refills on ferrous sulfate for anemia. She had a rash on her chest.  [facility] and was diagnosed with [facility]ingles. She did a virtual visit with me. Patient took fluconazole as directed. Informed that the rash has improved as well as the itching. She has joint pains and body aches. She saw her rheumatologist  She has missed work. He does snore. She reports that she saw urology for kidney stone and they told her that the stones were very small and nothing needs to be done      Past Medical History:   Diagnosis Date    Asthma     Depression     has been on meds since 1/11    GERD (gastroesophageal reflux disease)     Headache     Hypertension     Morbid obesity ([address]     Musculoskeletal disorder     Psychiatric disorder     Seasonal allergies      Past Surgical History:   Procedure Laterality Date    HX ADENOIDECTOMY      HX DILATION AND CURETTAGE      HX GYN  02/2020    ovarian cyst removal     HX HEENT      jaw surgery    HX TONSILLECTOMY       Current Outpatient Medications   Medication Sig    traZODone (DESYREL) 50 mg tablet     ferrous sulfate 325 mg (65 mg iron) tablet Take 1 Tab by mouth two (2) times a day.  lisinopriL (PRINIVIL, ZESTRIL) 20 mg tablet TAKE 1 TABLET BY MOUTH TWICE A DAY    propranolol LA (INDERAL LA) 60 mg SR capsule Take 1 Cap by mouth daily.  amLODIPine (NORVASC) 5 mg tablet Take 1 Tab by mouth nightly.  miconazole (MICOTIN) 2 % topical powder Apply  to affected area two (2) times a day.     albuterol (PROVENTIL VENTOLIN) 2.5 mg /3 mL (0.083 %) nebu USE 1 VIAL VIA NEBULIZER EVERY 6 HOURS AS NEEDED FOR WHEEZING  DULoxetine (CYMBALTA) 20 mg capsule     tiZANidine (ZANAFLEX) 4 mg tablet Take 1 Tab by mouth every six (6) hours as needed for Muscle Spasm(s).  erenumab-aooe (Aimovig Autoinjector) 140 mg/mL injection 1 mL by SubCUTAneous route every thirty (30) days.  gabapentin (NEURONTIN) 300 mg capsule Take 1 Cap by mouth two (2) times a day. Max Daily Amount: 600 mg. (Patient taking differently: Take 600 mg by mouth daily.)    clindamycin (CLINDAGEL) 1 % topical gel Apply  to affected area two (2) times a day. use thin film on affected area    albuterol (PROVENTIL HFA, VENTOLIN HFA, PROAIR HFA) 90 mcg/actuation inhaler Take 1 Puff by inhalation every six (6) hours as needed for Wheezing.  Nebulizer & Compressor machine Albuterol    ibuprofen (MOTRIN) 800 mg tablet 800 mg.    venlafaxine-SR (EFFEXOR-XR) 37.5 mg capsule 37.5 mg.    diclofenac EC (VOLTAREN) 50 mg EC tablet TAKE 1 TABLET BY MOUTH 3 TIMES A DAY AS NEEDED FOR PAIN    sertraline (ZOLOFT) 50 mg tablet TAKE 1 TABLET BY MOUTH EVERY DAY    oxyCODONE-acetaminophen (PERCOCET) 5-325 mg per tablet Take  by mouth every four (4) hours as needed for Pain.  dexAMETHasone (DECADRON) 1 mg tablet TAKE 1 TABLET THREE TIMES A DAY FOR 3 DAYS, THEN 1 TABLET TWO TIMES A DAY FOR 3 DAYS, THEN 1 TABLET ONCE A DAY FOR 3 DAYS AND THEN STOP    LORazepam (ATIVAN) 0.5 mg tablet Take 1 Tab by mouth nightly as needed for Anxiety. Max Daily Amount: 0.5 mg.    prazosin (MINIPRESS) 2 mg capsule Take 2 mg by mouth nightly.  metFORMIN (GLUCOPHAGE) 500 mg tablet Take  by mouth two (2) times daily (with meals).  hydroCHLOROthiazide (HYDRODIURIL) 25 mg tablet Take 1 Tab by mouth daily. No current facility-administered medications for this visit.      Health Maintenance   Topic Date Due    Hepatitis C Screening  Never done    Pneumococcal 0-64 years (1 of 2 - PPSV23) Never done    COVID-19 Vaccine (1) Never done    DTaP/Tdap/Td series (1 - Tdap) Never done    PAP AKA CERVICAL CYTOLOGY  Never done    Flu Vaccine (Season Ended) 09/01/2021       There is no immunization history on file for this patient. Patient's last menstrual period was 04/28/2021 (exact date). Allergies and Intolerances: Allergies   Allergen Reactions    Latex Rash    Witch Hazel Other (comments)       Family History:   Family History   Problem Relation Age of Onset    Hypertension Mother     Diabetes Mother     Heart Disease Mother    Darya Carlson Arthritis-rheumatoid Mother     Gout Mother     Depression Mother     Diabetes Father     Hypertension Father     Heart Disease Father     Depression Father     Arthritis-rheumatoid Maternal Grandmother     MS Maternal Grandmother        Social History:   She  reports that she has been smoking. She has never used smokeless tobacco.  She  reports previous alcohol use.             Review of Systems:   General: negative for - chills, fatigue, fever, weight change  Psych: negative for - anxiety, depression, irritability or mood swings  ENT: negative for - headaches, hearing change, nasal congestion, oral lesions, sneezing or sore throat  Heme/ Lymph: negative for - bleeding problems, bruising, pallor or swollen lymph nodes  Endo: negative for - hot flashes, polydipsia/polyuria or temperature intolerance  Resp: negative for - cough, shortness of breath or wheezing  CV: negative for - chest pain, edema or palpitations  GI: negative for - abdominal pain, change in bowel habits, constipation, diarrhea or nausea/vomiting  : negative for - dysuria, hematuria, incontinence, pelvic pain or vulvar/vaginal symptoms  MSK: negative for - joint pain, joint swelling or muscle pain  Neuro: negative for - confusion, headaches, seizures or weakness  Derm: negative for - dry skin, hair changes, rash or skin lesion changes          Physical:   Vitals:   Vitals:    05/18/21 1557 05/18/21 1610   BP: (!) 168/112 (!) 162/133   Pulse: (!) 103    Resp: 17    Temp: 98 °F (36.7 °C)    TempSrc: Oral    SpO2: 97%    Weight: 265 lb 9.6 oz (120.5 kg)    Height: 5' 4\" (1.626 m)            Exam:   HEENT- atraumatic,normocephalic, awake, oriented, well nourished  Neck - supple,no enlarged lymph nodes, no JVD, no thyromegaly  Chest- CTA, no rhonchi, no crackles  Heart- rrr, no murmurs / gallop/rub  Abdomen- soft,BS+,NT, no hepatosplenomegaly  Ext - no c/c/edema   Neuro- no focal deficits. Power 5/5 all extremities  Skin - warm,dry, no obvious rashes. Review of Data:   LABS:   Lab Results   Component Value Date/Time    WBC 8.8 01/25/2021 12:00 AM    HGB 10.8 (L) 01/25/2021 12:00 AM    HCT 35.0 01/25/2021 12:00 AM    PLATELET 948 03/39/4928 12:00 AM     Lab Results   Component Value Date/Time    Sodium 143 01/25/2021 12:00 AM    Potassium 4.0 01/25/2021 12:00 AM    Chloride 106 01/25/2021 12:00 AM    CO2 26 01/25/2021 12:00 AM    Glucose 87 01/25/2021 12:00 AM    BUN 12 01/25/2021 12:00 AM    Creatinine 0.65 01/25/2021 12:00 AM     Lab Results   Component Value Date/Time    Cholesterol, total 162 10/17/2019 03:24 PM    HDL Cholesterol 40 10/17/2019 03:24 PM    LDL, calculated 103 (H) 10/17/2019 03:24 PM    Triglyceride 95 10/17/2019 03:24 PM     No components found for: GPT        Impression / Plan:        ICD-10-CM ICD-9-CM    1. Essential hypertension  I10 401.9 lisinopriL (PRINIVIL, ZESTRIL) 20 mg tablet      propranolol LA (INDERAL LA) 60 mg SR capsule      amLODIPine (NORVASC) 5 mg tablet   2. Anemia, unspecified type  D64.9 285.9 ferrous sulfate 325 mg (65 mg iron) tablet   3. Migraine without aura and without status migrainosus, not intractable  G43.009 346.10    4. Chronic midline low back pain, unspecified whether sciatica present  M54.5 724.2     G89.29 338.29    5. Kidney stone  N20.0 592.0    6. Snoring  R06.83 786.09 SLEEP MEDICINE REFERRAL   7.  Tinea cruris  B35.6 110.3 miconazole (MICOTIN) 2 % topical powder     Hypertension-low-salt diet      Explained to patient risk benefits of the medications. Advised patient to stop meds if having any side effects. Pt verbalized understanding of the instructions. I have discussed the diagnosis with the patient and the intended plan as seen in the above orders. The patient has received an after-visit summary and questions were answered concerning future plans. I have discussed medication side effects and warnings with the patient as well. I have reviewed the plan of care with the patient, accepted their input and they are in agreement with the treatment goals. Reviewed plan of care. Patient has provided input and agrees with goals. Follow-up and Dispositions    · Return in about 1 month (around 6/18/2021).          Roger Haynes MD

## 2021-05-25 NOTE — TELEPHONE ENCOUNTER
Re: Danita TRIVEDI request through [address]    Silver[employer]# [id number]    See message already sent to PA rep. Submitted info for PA and sent last office note. Awaiting update.

## 2021-05-26 NOTE — TELEPHONE ENCOUNTER
[employer] PA approval    [id number]. [employer] INJ 140MG/ML is approved through 05/25/2022    Faxed to [facility]/pharmacy #[id number]- [address], [address]  Phone: [phone]  Fax:[phone]     approved 05/25/2021-05/25/2022    Scanned fax conf to chart.

## 2021-06-01 NOTE — ANCILLARY DISCHARGE INSTRUCTIONS
[employer] [facility]  [address], [place], [address]  Phone: [phone]  Fax: [phone]    Discharge Summary    Name: Everrett Babinski   : 1995   MD: Britta Walters MD       Treatment Diagnosis: Pain in right shoulder [M25.511]  Left shoulder pain [M25.512]  Lower back pain [M54.5]  Start of Care: 21    Visits from Start of Care: 3  Missed Visits: 5 no shows and cancels. Summary of Care:Pt was last seen . Pt no showed and cancelled 5 times total.  Pt did not return to PT after last visit. t will be D/C to [facility]P. D/C to [facility].       Georgiana Vasquez, PT 2021 12:28 PM

## 2021-07-19 DIAGNOSIS — M15.9 PRIMARY OSTEOARTHRITIS INVOLVING MULTIPLE JOINTS: ICD-10-CM

## 2021-07-19 RX ORDER — TIZANIDINE 4 MG/1
TABLET ORAL
Qty: 60 TABLET | Refills: 2 | Status: SHIPPED | OUTPATIENT
Start: 2021-07-19 | End: 2022-07-13

## 2021-07-19 NOTE — TELEPHONE ENCOUNTER
Refilled once more, but will no longer continue refilling tizanidine as we do not follow patients longitudinally for degenerative back pain. If she needs further refills going forward, she will need to reach out to her PCP, or identify a Pain Management doctor by reaching out to her insurance directly to find one that accepts her insurance.

## 2022-03-19 PROBLEM — I10 ESSENTIAL HYPERTENSION: Status: ACTIVE | Noted: 2020-01-30

## 2022-03-20 PROBLEM — E66.01 OBESITY, MORBID (HCC): Status: ACTIVE | Noted: 2020-01-30

## 2022-07-13 ENCOUNTER — OFFICE VISIT (OUTPATIENT)
Dept: PRIMARY CARE CLINIC | Age: 27
End: 2022-07-13
Payer: MEDICAID

## 2022-07-13 VITALS
WEIGHT: 239 LBS | HEIGHT: 64 IN | TEMPERATURE: 97.1 F | BODY MASS INDEX: 40.8 KG/M2 | HEART RATE: 99 BPM | RESPIRATION RATE: 18 BRPM | SYSTOLIC BLOOD PRESSURE: 159 MMHG | OXYGEN SATURATION: 95 % | DIASTOLIC BLOOD PRESSURE: 99 MMHG

## 2022-07-13 DIAGNOSIS — D64.9 ANEMIA, UNSPECIFIED TYPE: ICD-10-CM

## 2022-07-13 DIAGNOSIS — G43.909 MIGRAINE WITHOUT STATUS MIGRAINOSUS, NOT INTRACTABLE, UNSPECIFIED MIGRAINE TYPE: ICD-10-CM

## 2022-07-13 DIAGNOSIS — E28.2 PCOS (POLYCYSTIC OVARIAN SYNDROME): ICD-10-CM

## 2022-07-13 DIAGNOSIS — E55.9 VITAMIN D DEFICIENCY: ICD-10-CM

## 2022-07-13 DIAGNOSIS — I10 ESSENTIAL HYPERTENSION: ICD-10-CM

## 2022-07-13 DIAGNOSIS — F32.A DEPRESSION, UNSPECIFIED DEPRESSION TYPE: ICD-10-CM

## 2022-07-13 DIAGNOSIS — Z00.00 ANNUAL PHYSICAL EXAM: Primary | ICD-10-CM

## 2022-07-13 PROCEDURE — 99395 PREV VISIT EST AGE 18-39: CPT | Performed by: INTERNAL MEDICINE

## 2022-07-13 RX ORDER — PROPRANOLOL HYDROCHLORIDE 60 MG/1
60 CAPSULE, EXTENDED RELEASE ORAL DAILY
Qty: 30 CAPSULE | Refills: 3 | Status: SHIPPED | OUTPATIENT
Start: 2022-07-13

## 2022-07-13 RX ORDER — BUSPIRONE HYDROCHLORIDE 7.5 MG/1
7.5 TABLET ORAL 2 TIMES DAILY
Qty: 180 TABLET | Refills: 0 | Status: SHIPPED | OUTPATIENT
Start: 2022-07-13

## 2022-07-13 RX ORDER — HYDROCHLOROTHIAZIDE 25 MG/1
25 TABLET ORAL DAILY
Qty: 90 TABLET | Refills: 4 | Status: SHIPPED | OUTPATIENT
Start: 2022-07-13

## 2022-07-13 RX ORDER — ESCITALOPRAM OXALATE 20 MG/1
20 TABLET ORAL DAILY
Qty: 90 TABLET | Refills: 0 | Status: SHIPPED | OUTPATIENT
Start: 2022-07-13

## 2022-07-13 NOTE — PROGRESS NOTES
Ed Deleon is a 27 y.o.  female and presents with     Chief Complaint   Patient presents with    Physical     Pt is here for a physical.  Pt was seeing Psychiatrist, but was discharge as she could not keep appt. Pt was being evaluated for ADD. Pt is currently not taking any meds  Pt is seeing a counsellor. Trying to get a therapist .  Insurance would not pay for Aimovig  Gets headaches everyday. Pt lost her job today. Was working at a [employer]. Pt has not seen Obgyn  Pt was taking lexapro and BUspirone most recently , ran out of it last month  Was taking lexapro 20 mg daily and buspar 15 mg daily. Patient is requesting refills on this medication states she sees a psychiatrist.          Past Medical History:   Diagnosis Date    Asthma     Depression     has been on meds since 1/11    GERD (gastroesophageal reflux disease)     Headache     Hypertension     Morbid obesity ([address]     Musculoskeletal disorder     Psychiatric disorder     Seasonal allergies      Past Surgical History:   Procedure Laterality Date    HX ADENOIDECTOMY      HX DILATION AND CURETTAGE      HX GYN  02/2020    ovarian cyst removal     HX HEENT      jaw surgery    HX TONSILLECTOMY       Current Outpatient Medications   Medication Sig    escitalopram oxalate (LEXAPRO) 20 mg tablet Take 1 Tablet by mouth daily.  busPIRone (BUSPAR) 7.5 mg tablet Take 1 Tablet by mouth two (2) times a day.  propranolol LA (INDERAL LA) 60 mg SR capsule Take 1 Capsule by mouth daily.  hydroCHLOROthiazide (HYDRODIURIL) 25 mg tablet Take 1 Tablet by mouth daily.  ferrous sulfate 325 mg (65 mg iron) tablet Take 1 Tab by mouth two (2) times a day.  lisinopriL (PRINIVIL, ZESTRIL) 20 mg tablet TAKE 1 TABLET BY MOUTH TWICE A DAY    amLODIPine (NORVASC) 5 mg tablet Take 1 Tab by mouth nightly.  miconazole (MICOTIN) 2 % topical powder Apply  to affected area two (2) times a day.     albuterol (PROVENTIL VENTOLIN) 2.5 mg /3 mL (0.083 %) nebu USE 1 VIAL VIA NEBULIZER EVERY 6 HOURS AS NEEDED FOR WHEEZING    erenumab-aooe (Aimovig Autoinjector) 140 mg/mL injection 1 mL by SubCUTAneous route every thirty (30) days.  clindamycin (CLINDAGEL) 1 % topical gel Apply  to affected area two (2) times a day. use thin film on affected area    diclofenac EC (VOLTAREN) 50 mg EC tablet TAKE 1 TABLET BY MOUTH 3 TIMES A DAY AS NEEDED FOR PAIN    albuterol (PROVENTIL HFA, VENTOLIN HFA, PROAIR HFA) 90 mcg/actuation inhaler Take 1 Puff by inhalation every six (6) hours as needed for Wheezing.  Nebulizer & Compressor machine Albuterol    prazosin (MINIPRESS) 2 mg capsule Take 2 mg by mouth nightly.  metFORMIN (GLUCOPHAGE) 500 mg tablet Take  by mouth two (2) times daily (with meals). No current facility-administered medications for this visit. Health Maintenance   Topic Date Due    Hepatitis C Screening  Never done    COVID-19 Vaccine (1) Never done    Pneumococcal 0-64 years (1 - PCV) Never done    DTaP/Tdap/Td series (1 - Tdap) Never done    Pap Smear  Never done    Depression Screen  03/03/2022    Flu Vaccine (1) 09/01/2022       There is no immunization history on file for this patient. No LMP recorded. Allergies and Intolerances: Allergies   Allergen Reactions    Latex Rash    Witch Hazel Other (comments)       Family History:   Family History   Problem Relation Age of Onset    Hypertension Mother     Diabetes Mother     Heart Disease Mother    Blase Liming Arthritis-rheumatoid Mother     Gout Mother     Depression Mother     Diabetes Father     Hypertension Father     Heart Disease Father     Depression Father     Arthritis-rheumatoid Maternal Grandmother     Mult Sclerosis Maternal Grandmother        Social History:   She  reports that she has been smoking cigarettes. She started smoking about 16 years ago. She has a 0.80 pack-year smoking history.  She has never used smokeless tobacco.  She  reports previous alcohol use. Review of Systems:   General: negative for - chills, fatigue, fever, weight change  Psych: negative for - anxiety, depression, irritability or mood swings  ENT: negative for - headaches, hearing change, nasal congestion, oral lesions, sneezing or sore throat  Heme/ Lymph: negative for - bleeding problems, bruising, pallor or swollen lymph nodes  Endo: negative for - hot flashes, polydipsia/polyuria or temperature intolerance  Resp: negative for - cough, shortness of breath or wheezing  CV: negative for - chest pain, edema or palpitations  GI: negative for - abdominal pain, change in bowel habits, constipation, diarrhea or nausea/vomiting  : negative for - dysuria, hematuria, incontinence, pelvic pain or vulvar/vaginal symptoms  MSK: negative for - joint pain, joint swelling or muscle pain  Neuro: negative for - confusion, headaches, seizures or weakness  Derm: negative for - dry skin, hair changes, rash or skin lesion changes          Physical:   Vitals:   Vitals:    07/13/22 1311   BP: (!) 159/99   Pulse: 99   Resp: 18   Temp: 97.1 °F (36.2 °C)   TempSrc: Temporal   SpO2: 95%   Weight: 239 lb (108.4 kg)   Height: 5' 4\" (1.626 m)           Exam:   HEENT- atraumatic,normocephalic, awake, oriented, well nourished  Neck - supple,no enlarged lymph nodes, no JVD, no thyromegaly  Chest- CTA, no rhonchi, no crackles  Heart- rrr, no murmurs / gallop/rub  Abdomen- soft,BS+,NT, no hepatosplenomegaly  Ext - no c/c/edema   Neuro- no focal deficits. Power 5/5 all extremities  Skin - warm,dry, no obvious rashes.           Review of Data:   LABS:   Lab Results   Component Value Date/Time    WBC 8.8 01/25/2021 12:00 AM    HGB 10.8 (L) 01/25/2021 12:00 AM    HCT 35.0 01/25/2021 12:00 AM    PLATELET 665 52/55/9727 12:00 AM     Lab Results   Component Value Date/Time    Sodium 143 01/25/2021 12:00 AM    Potassium 4.0 01/25/2021 12:00 AM    Chloride 106 01/25/2021 12:00 AM    CO2 26 01/25/2021 12:00 AM Glucose 87 01/25/2021 12:00 AM    BUN 12 01/25/2021 12:00 AM    Creatinine 0.65 01/25/2021 12:00 AM     Lab Results   Component Value Date/Time    Cholesterol, total 162 10/17/2019 03:24 PM    HDL Cholesterol 40 10/17/2019 03:24 PM    LDL, calculated 103 (H) 10/17/2019 03:24 PM    Triglyceride 95 10/17/2019 03:24 PM     No components found for: GPT        Impression / Plan:        ICD-10-CM ICD-9-CM    1. Annual physical exam  Z00.00 V70.0 CBC WITH AUTOMATED DIFF      METABOLIC PANEL, COMPREHENSIVE      LIPID PANEL      TSH 3RD GENERATION      HEMOGLOBIN A1C WITH EAG   2. Vitamin D deficiency  E55.9 268.9 VITAMIN D, 25 HYDROXY   3. Depression, unspecified depression type  F32. A 311 REFERRAL TO PSYCHIATRY      escitalopram oxalate (LEXAPRO) 20 mg tablet      busPIRone (BUSPAR) 7.5 mg tablet   4. Migraine without status migrainosus, not intractable, unspecified migraine type  G43.909 346.90 propranolol LA (INDERAL LA) 60 mg SR capsule   5. Anemia, unspecified type  D64.9 285.9    6. PCOS (polycystic ovarian syndrome)  E28.2 256.4    7. Essential hypertension  I10 401.9 propranolol LA (INDERAL LA) 60 mg SR capsule      hydroCHLOROthiazide (HYDRODIURIL) 25 mg tablet     3 months refills given on Lexapro and BuSpar until patient is able to establish with a new psychiatrist    Hypertension-advised low-salt diet        Explained to patient risk benefits of the medications. Advised patient to stop meds if having any side effects. Pt verbalized understanding of the instructions. I have discussed the diagnosis with the patient and the intended plan as seen in the above orders. The patient has received an after-visit summary and questions were answered concerning future plans. I have discussed medication side effects and warnings with the patient as well. I have reviewed the plan of care with the patient, accepted their input and they are in agreement with the treatment goals. Reviewed plan of care.  Patient has provided input and agrees with goals. Follow-up and Dispositions    · Return in about 3 months (around 10/13/2022).          Olvin Zepeda MD

## 2022-07-13 NOTE — PROGRESS NOTES
Chief Complaint   Patient presents with    Physical        Visit Vitals  BP (!) 159/99 (BP 1 Location: Left upper arm, BP Patient Position: Sitting)   Pulse 99   Temp 97.1 °F (36.2 °C) (Temporal)   Resp 18   Ht 5' 4\" (1.626 m)   Wt 239 lb (108.4 kg)   SpO2 95%   BMI 41.02 kg/m²        Health Maintenance Due   Topic    Hepatitis C Screening     COVID-19 Vaccine (1)    Pneumococcal 0-64 years (1 - PCV)    DTaP/Tdap/Td series (1 - Tdap)    Pap Smear     Depression Screen         1. Have you been to the ER, urgent care clinic since your last visit? Hospitalized since your last visit? No    2. Have you seen or consulted any other health care providers outside of the [address] [place] [address] since your last visit? Include any pap smears or colon screening.  No

## 2022-07-18 DIAGNOSIS — E55.9 VITAMIN D DEFICIENCY: Primary | ICD-10-CM

## 2022-07-18 RX ORDER — ERGOCALCIFEROL 1.25 MG/1
50000 CAPSULE ORAL
Qty: 12 CAPSULE | Refills: 1 | Status: SHIPPED | OUTPATIENT
Start: 2022-07-18

## 2022-11-15 ENCOUNTER — OFFICE VISIT (OUTPATIENT)
Dept: URGENT CARE | Age: 27
End: 2022-11-15
Payer: MEDICAID

## 2022-11-15 VITALS
WEIGHT: 237 LBS | HEART RATE: 73 BPM | OXYGEN SATURATION: 100 % | BODY MASS INDEX: 40.68 KG/M2 | RESPIRATION RATE: 16 BRPM | SYSTOLIC BLOOD PRESSURE: 165 MMHG | TEMPERATURE: 99.1 F | DIASTOLIC BLOOD PRESSURE: 134 MMHG

## 2022-11-15 DIAGNOSIS — H10.32 ACUTE BACTERIAL CONJUNCTIVITIS OF LEFT EYE: ICD-10-CM

## 2022-11-15 DIAGNOSIS — J01.10 ACUTE NON-RECURRENT FRONTAL SINUSITIS: Primary | ICD-10-CM

## 2022-11-15 DIAGNOSIS — R03.0 ELEVATED BLOOD PRESSURE READING IN OFFICE WITHOUT DIAGNOSIS OF HYPERTENSION: ICD-10-CM

## 2022-11-15 PROCEDURE — 99203 OFFICE O/P NEW LOW 30 MIN: CPT | Performed by: NURSE PRACTITIONER

## 2022-11-15 PROCEDURE — 3075F SYST BP GE 130 - 139MM HG: CPT | Performed by: NURSE PRACTITIONER

## 2022-11-15 PROCEDURE — 3078F DIAST BP <80 MM HG: CPT | Performed by: NURSE PRACTITIONER

## 2022-11-15 RX ORDER — FLUTICASONE PROPIONATE 50 MCG
SPRAY, SUSPENSION (ML) NASAL
Qty: 1 EACH | Refills: 0 | Status: SHIPPED | OUTPATIENT
Start: 2022-11-15

## 2022-11-15 RX ORDER — FLUCONAZOLE 150 MG/1
TABLET ORAL
Qty: 2 TABLET | Refills: 0 | Status: SHIPPED | OUTPATIENT
Start: 2022-11-15

## 2022-11-15 RX ORDER — POLYMYXIN B SULFATE AND TRIMETHOPRIM 1; 10000 MG/ML; [USP'U]/ML
1 SOLUTION OPHTHALMIC EVERY 4 HOURS
Qty: 1 EACH | Refills: 0 | Status: SHIPPED | OUTPATIENT
Start: 2022-11-15 | End: 2022-11-22

## 2022-11-15 RX ORDER — AMOXICILLIN AND CLAVULANATE POTASSIUM 875; 125 MG/1; MG/1
1 TABLET, FILM COATED ORAL 2 TIMES DAILY
Qty: 14 TABLET | Refills: 0 | Status: SHIPPED | OUTPATIENT
Start: 2022-11-15 | End: 2022-11-22

## 2022-11-15 NOTE — LETTER
NOTIFICATION RETURN TO WORK / SCHOOL    11/15/2022 3:56 PM    Ms. [address] [address]      To Whom It May Concern:    Sanya Bob is currently under the care of [address]. She was seen in the office today and will return to work on: 11/16/22, pending symptoms. If there are questions or concerns please have the patient contact our office.         Sincerely,      TRACEY Stephens  [facility] PROVIDER

## 2022-11-15 NOTE — PROGRESS NOTES
The history is provided by the patient. Flu  This is a new problem. The current episode started more than 1 week ago (2 weeks). The problem occurs constantly. The problem has been gradually worsening. Associated symptoms include headaches. Pertinent negatives include no chest pain and no abdominal pain. The symptoms are aggravated by sneezing, coughing, swallowing and standing. Nothing relieves the symptoms. Treatments tried: mucinex. The treatment provided no relief. Past Medical History:   Diagnosis Date    Asthma     Depression     has been on meds since 1/11    GERD (gastroesophageal reflux disease)     Headache     Hypertension     Morbid obesity ([address]     Musculoskeletal disorder     Psychiatric disorder     Seasonal allergies         Past Surgical History:   Procedure Laterality Date    HX ADENOIDECTOMY      HX DILATION AND CURETTAGE      HX GYN  02/2020    ovarian cyst removal     HX HEENT      jaw surgery    HX TONSILLECTOMY           Family History   Problem Relation Age of Onset    Hypertension Mother     Diabetes Mother     Heart Disease Mother     Arthritis-rheumatoid Mother     Gout Mother     Depression Mother     Diabetes Father     Hypertension Father     Heart Disease Father     Depression Father     Arthritis-rheumatoid Maternal Grandmother     Mult Sclerosis Maternal Grandmother         Social History     Socioeconomic History    Marital status: SINGLE     Spouse name: Not on file    Number of children: Not on file    Years of education: Not on file    Highest education level: Not on file   Occupational History    Not on file   Tobacco Use    Smoking status: Some Days     Packs/day: 0.05     Years: 16.00     Pack years: 0.80     Types: Cigarettes     Start date: 7/13/2006    Smokeless tobacco: Never    Tobacco comments:     pt smokes a pack of ciggerattes a week.     Vaping Use    Vaping Use: Never used   Substance and Sexual Activity    Alcohol use: Not Currently    Drug use: Yes     Types: Marijuana     Comment: smoke daily    Sexual activity: Yes     Partners: Male     Birth control/protection: None   Other Topics Concern     Service Not Asked    Blood Transfusions Not Asked    Caffeine Concern Not Asked    Occupational Exposure Not Asked    Hobby Hazards Not Asked    Sleep Concern Not Asked    Stress Concern Not Asked    Weight Concern Not Asked    Special Diet Not Asked    Back Care Not Asked    Exercise Not Asked    Bike Helmet Not Asked   [address] Not Asked    Self-Exams Not Asked   Social History Narrative    Not on file     Social Determinants of Health     Financial Resource Strain: Not on file   Food Insecurity: Not on file   Transportation Needs: Not on file   Physical Activity: Not on file   Stress: Not on file   Social Connections: Not on file   Intimate Partner Violence: Not on file   Housing Stability: Not on file                ALLERGIES: Latex and Witch hazel    Review of Systems   Constitutional:  Positive for activity change, appetite change and fatigue. Negative for fever. HENT:  Positive for congestion, postnasal drip, rhinorrhea, sinus pain and sore throat. Eyes:  Positive for discharge. Respiratory:  Positive for cough. Cardiovascular:  Negative for chest pain and palpitations. Gastrointestinal:  Negative for abdominal pain. Neurological:  Positive for headaches. Vitals:    11/15/22 1527   BP: (!) 165/134   Pulse: 73   Resp: 16   Temp: 99.1 °F (37.3 °C)   SpO2: 100%   Weight: 237 lb (107.5 kg)       Physical Exam  Constitutional:       Appearance: Normal appearance. She is ill-appearing. HENT:      Right Ear: A middle ear effusion is present. Left Ear: A middle ear effusion is present. Nose:      Right Turbinates: Swollen. Left Turbinates: Swollen. Right Sinus: Frontal sinus tenderness present. No maxillary sinus tenderness. Left Sinus: Frontal sinus tenderness present. No maxillary sinus tenderness. Mouth/Throat:      Pharynx: No oropharyngeal exudate or posterior oropharyngeal erythema. Tonsils: No tonsillar exudate. 0 on the right. 0 on the left. Eyes:      General: Lids are normal.         Left eye: Discharge present. Conjunctiva/sclera:      Left eye: Exudate present. Cardiovascular:      Rate and Rhythm: Normal rate and regular rhythm. Pulmonary:      Effort: Pulmonary effort is normal.      Breath sounds: Normal breath sounds. Lymphadenopathy:      Cervical: Cervical adenopathy present. Right cervical: Superficial cervical adenopathy present. Left cervical: Superficial cervical adenopathy present. Neurological:      Mental Status: She is alert. MDM     Differential Diagnosis; Clinical Impression; Plan:     (J01.10) Acute non-recurrent frontal sinusitis  (primary encounter diagnosis)  (H10.32) Acute bacterial conjunctivitis of left eye  (R03.0) Elevated blood pressure reading in office without diagnosis of hypertension    Patient education: discussed otc treatments to help with symptoms. Note given for today. Also discussed elevated blood pressure- pt reports that she is going to recheck this evening. If blood pressure remains elevated at 160-170/100 she is going to follow up tomorrow. Medication: Augmentin 875/125mg BID x 7 days, Diflucan, ophthalmic drops, Flonase  F/U: PRN if symptoms fail to improve within 72 hours, or if they worsen.     Procedures

## 2022-11-15 NOTE — PATIENT INSTRUCTIONS
Patient education: discussed otc treatments to help with symptoms. Note given for today. Medication: Augmentin 875/125mg BID x 7 days, Diflucan, ophthalmic drops, Flonase  F/U: PRN if symptoms fail to improve within 72 hours, or if they worsen.

## 2022-11-29 NOTE — TELEPHONE ENCOUNTER
Called pt regarding Bariatric Referral from Rosy Blanc MD to Dr Fili Flores. No answer LVM to return call    I still have not received forms or seminar completion from pt. Yes

## 2023-06-05 ENCOUNTER — APPOINTMENT (OUTPATIENT)
Facility: HOSPITAL | Age: 28
End: 2023-06-05
Payer: MEDICAID

## 2023-06-05 ENCOUNTER — HOSPITAL ENCOUNTER (EMERGENCY)
Facility: HOSPITAL | Age: 28
Discharge: HOME | End: 2023-06-05
Attending: EMERGENCY MEDICINE | Admitting: EMERGENCY MEDICINE
Payer: MEDICAID

## 2023-06-05 VITALS
SYSTOLIC BLOOD PRESSURE: 188 MMHG | HEIGHT: 64 IN | OXYGEN SATURATION: 99 % | TEMPERATURE: 97.4 F | WEIGHT: 256.39 LBS | DIASTOLIC BLOOD PRESSURE: 134 MMHG | BODY MASS INDEX: 43.77 KG/M2 | HEART RATE: 74 BPM | RESPIRATION RATE: 20 BRPM

## 2023-06-05 DIAGNOSIS — R11.0 NAUSEA: ICD-10-CM

## 2023-06-05 DIAGNOSIS — R42 DIZZINESS: Primary | ICD-10-CM

## 2023-06-05 LAB
ANION GAP SERPL CALC-SCNC: 3 MMOL/L (ref 5–15)
APPEARANCE UR: CLEAR
BACTERIA URNS QL MICRO: NEGATIVE /HPF
BASOPHILS # BLD: 0 K/UL (ref 0–0.1)
BASOPHILS NFR BLD: 0 % (ref 0–1)
BILIRUB UR QL: NEGATIVE
BUN SERPL-MCNC: 10 MG/DL (ref 6–20)
BUN/CREAT SERPL: 15 (ref 12–20)
CALCIUM SERPL-MCNC: 9.3 MG/DL (ref 8.5–10.1)
CHLORIDE SERPL-SCNC: 108 MMOL/L (ref 97–108)
CO2 SERPL-SCNC: 25 MMOL/L (ref 21–32)
COLOR UR: NORMAL
COMMENT:: NORMAL
CREAT SERPL-MCNC: 0.66 MG/DL (ref 0.55–1.02)
CRL: 2.16 CM
DIFFERENTIAL METHOD BLD: ABNORMAL
EKG ATRIAL RATE: 67 BPM
EKG DIAGNOSIS: NORMAL
EKG P AXIS: 47 DEGREES
EKG P-R INTERVAL: 180 MS
EKG Q-T INTERVAL: 394 MS
EKG QRS DURATION: 90 MS
EKG QTC CALCULATION (BAZETT): 416 MS
EKG R AXIS: 2 DEGREES
EKG T AXIS: 0 DEGREES
EKG VENTRICULAR RATE: 67 BPM
EOSINOPHIL # BLD: 0.1 K/UL (ref 0–0.4)
EOSINOPHIL NFR BLD: 2 % (ref 0–7)
EPITH CASTS URNS QL MICRO: NORMAL /LPF
ERYTHROCYTE [DISTWIDTH] IN BLOOD BY AUTOMATED COUNT: 17.1 % (ref 11.5–14.5)
GLUCOSE SERPL-MCNC: 92 MG/DL (ref 65–100)
GLUCOSE UR STRIP.AUTO-MCNC: NEGATIVE MG/DL
HCT VFR BLD AUTO: 32.6 % (ref 35–47)
HGB BLD-MCNC: 10.2 G/DL (ref 11.5–16)
HGB UR QL STRIP: NEGATIVE
HYALINE CASTS URNS QL MICRO: NORMAL /LPF (ref 0–5)
IMM GRANULOCYTES # BLD AUTO: 0 K/UL (ref 0–0.04)
IMM GRANULOCYTES NFR BLD AUTO: 0 % (ref 0–0.5)
KETONES UR QL STRIP.AUTO: NEGATIVE MG/DL
LEUKOCYTE ESTERASE UR QL STRIP.AUTO: NEGATIVE
LYMPHOCYTES # BLD: 2.6 K/UL (ref 0.8–3.5)
LYMPHOCYTES NFR BLD: 36 % (ref 12–49)
MCH RBC QN AUTO: 25.5 PG (ref 26–34)
MCHC RBC AUTO-ENTMCNC: 31.3 G/DL (ref 30–36.5)
MCV RBC AUTO: 81.5 FL (ref 80–99)
MONOCYTES # BLD: 0.5 K/UL (ref 0–1)
MONOCYTES NFR BLD: 7 % (ref 5–13)
NEUTS SEG # BLD: 4 K/UL (ref 1.8–8)
NEUTS SEG NFR BLD: 55 % (ref 32–75)
NITRITE UR QL STRIP.AUTO: NEGATIVE
NRBC # BLD: 0 K/UL (ref 0–0.01)
NRBC BLD-RTO: 0 PER 100 WBC
PH UR STRIP: 7.5 (ref 5–8)
PLATELET # BLD AUTO: 285 K/UL (ref 150–400)
PMV BLD AUTO: 8.9 FL (ref 8.9–12.9)
POTASSIUM SERPL-SCNC: 3.4 MMOL/L (ref 3.5–5.1)
PROT UR STRIP-MCNC: NEGATIVE MG/DL
RBC # BLD AUTO: 4 M/UL (ref 3.8–5.2)
RBC #/AREA URNS HPF: NORMAL /HPF (ref 0–5)
SODIUM SERPL-SCNC: 136 MMOL/L (ref 136–145)
SP GR UR REFRACTOMETRY: 1.01 (ref 1–1.03)
SPECIMEN HOLD: NORMAL
SPECIMEN HOLD: NORMAL
TROPONIN I SERPL HS-MCNC: 18 NG/L (ref 0–37)
UROBILINOGEN UR QL STRIP.AUTO: 0.2 EU/DL (ref 0.2–1)
WBC # BLD AUTO: 7.3 K/UL (ref 3.6–11)
WBC URNS QL MICRO: NORMAL /HPF (ref 0–4)

## 2023-06-05 PROCEDURE — 80048 BASIC METABOLIC PNL TOTAL CA: CPT

## 2023-06-05 PROCEDURE — 84484 ASSAY OF TROPONIN QUANT: CPT

## 2023-06-05 PROCEDURE — 96374 THER/PROPH/DIAG INJ IV PUSH: CPT

## 2023-06-05 PROCEDURE — 36415 COLL VENOUS BLD VENIPUNCTURE: CPT

## 2023-06-05 PROCEDURE — 6370000000 HC RX 637 (ALT 250 FOR IP)

## 2023-06-05 PROCEDURE — 99284 EMERGENCY DEPT VISIT MOD MDM: CPT

## 2023-06-05 PROCEDURE — 6360000002 HC RX W HCPCS

## 2023-06-05 PROCEDURE — 81001 URINALYSIS AUTO W/SCOPE: CPT

## 2023-06-05 PROCEDURE — 2580000003 HC RX 258

## 2023-06-05 PROCEDURE — 76801 OB US < 14 WKS SINGLE FETUS: CPT

## 2023-06-05 PROCEDURE — 93005 ELECTROCARDIOGRAM TRACING: CPT

## 2023-06-05 PROCEDURE — 85025 COMPLETE CBC W/AUTO DIFF WBC: CPT

## 2023-06-05 PROCEDURE — 76817 TRANSVAGINAL US OBSTETRIC: CPT

## 2023-06-05 PROCEDURE — 93010 ELECTROCARDIOGRAM REPORT: CPT | Performed by: SPECIALIST

## 2023-06-05 RX ORDER — ACETAMINOPHEN 500 MG
1000 TABLET ORAL
Status: DISCONTINUED | OUTPATIENT
Start: 2023-06-05 | End: 2023-06-05

## 2023-06-05 RX ORDER — ACETAMINOPHEN 325 MG/1
975 TABLET ORAL
Status: COMPLETED | OUTPATIENT
Start: 2023-06-05 | End: 2023-06-05

## 2023-06-05 RX ORDER — 0.9 % SODIUM CHLORIDE 0.9 %
1000 INTRAVENOUS SOLUTION INTRAVENOUS ONCE
Status: COMPLETED | OUTPATIENT
Start: 2023-06-05 | End: 2023-06-05

## 2023-06-05 RX ORDER — ONDANSETRON 4 MG/1
4 TABLET, FILM COATED ORAL EVERY 8 HOURS PRN
Qty: 15 TABLET | Refills: 0 | Status: SHIPPED | OUTPATIENT
Start: 2023-06-05

## 2023-06-05 RX ORDER — ONDANSETRON 2 MG/ML
4 INJECTION INTRAMUSCULAR; INTRAVENOUS ONCE
Status: COMPLETED | OUTPATIENT
Start: 2023-06-05 | End: 2023-06-05

## 2023-06-05 RX ADMIN — SODIUM CHLORIDE 1000 ML: 9 INJECTION, SOLUTION INTRAVENOUS at 15:08

## 2023-06-05 RX ADMIN — ONDANSETRON 4 MG: 2 INJECTION INTRAMUSCULAR; INTRAVENOUS at 15:08

## 2023-06-05 RX ADMIN — ACETAMINOPHEN 975 MG: 325 TABLET ORAL at 15:08

## 2023-06-05 ASSESSMENT — ENCOUNTER SYMPTOMS
DIARRHEA: 0
CONSTIPATION: 1
VOMITING: 0
NAUSEA: 1
ABDOMINAL PAIN: 0
SHORTNESS OF BREATH: 1

## 2023-06-05 ASSESSMENT — PAIN - FUNCTIONAL ASSESSMENT: PAIN_FUNCTIONAL_ASSESSMENT: NONE - DENIES PAIN

## 2023-06-05 NOTE — ED PROVIDER NOTES
normal.      Left Ear: External ear normal.      Nose: Nose normal.      Mouth/Throat:      Mouth: Mucous membranes are moist.      Pharynx: Oropharynx is clear. Eyes:      Extraocular Movements: Extraocular movements intact. Conjunctiva/sclera: Conjunctivae normal.      Pupils: Pupils are equal, round, and reactive to light. Cardiovascular:      Rate and Rhythm: Normal rate and regular rhythm. Pulmonary:      Effort: Pulmonary effort is normal.      Breath sounds: Normal breath sounds. Abdominal:      General: Abdomen is flat. Palpations: Abdomen is soft. Tenderness: There is abdominal tenderness in the suprapubic area. There is no guarding or rebound. Musculoskeletal:         General: Normal range of motion. Cervical back: Normal range of motion. Skin:     General: Skin is warm and dry. Neurological:      General: No focal deficit present. Mental Status: She is alert and oriented to person, place, and time. Psychiatric:         Mood and Affect: Mood normal.         Thought Content: Thought content normal.       DIAGNOSTIC RESULTS     EKG: All EKG's are interpreted by the Emergency Department Physician who either signs or Co-signs this chart in the absence of a cardiologist.        RADIOLOGY:   Non-plain film images such as CT, Ultrasound and MRI are read by the radiologist. Plain radiographic images are visualized and preliminarily interpreted by the emergency physician with the below findings:        Interpretation per the Radiologist below, if available at the time of this note:    US OB TRANSVAGINAL   Final Result   Single viable 8 week 6 day intrauterine pregnancy. US OB LESS THAN 14 WEEKS SINGLE OR FIRST GESTATION   Final Result   Single viable 8 week 6 day intrauterine pregnancy.               LABS:  Labs Reviewed   CBC WITH AUTO DIFFERENTIAL - Abnormal; Notable for the following components:       Result Value    Hemoglobin 10.2 (*)     Hematocrit 32.6 (*)

## 2023-06-05 NOTE — ED NOTES
Patient left ED in no acute distress, alert and oriented x4. Patient was encouraged to come back if symptoms get worse. Patient was provided with discharge instructions and prescriptions. All questions were answered. Patient left ambulatory.          Hesham Astorga LPN  87/23/66 [id number]

## 2023-06-05 NOTE — DISCHARGE INSTRUCTIONS
As discussed, your blood work, EKG, ultrasound and urine were all unremarkable for any emergent findings. Ensure adequate fluid intake. You are being prescribed Zofran for nausea. Follow up with your PCP and OB/GYN for further management. Return to the ER if you experience severe or worsening symptoms.

## 2023-06-05 NOTE — ED TRIAGE NOTES
Pt comes to ED from home with reports of lightheaded and dizziness x 2 weeks. _fever +constipation +nausea reports being 9 weeks pregnant. MICKI Tineo in triage.

## 2024-03-18 ENCOUNTER — OFFICE VISIT (OUTPATIENT)
Dept: PRIMARY CARE CLINIC | Facility: CLINIC | Age: 29
End: 2024-03-18
Payer: COMMERCIAL

## 2024-03-18 VITALS
HEART RATE: 90 BPM | TEMPERATURE: 98 F | DIASTOLIC BLOOD PRESSURE: 115 MMHG | SYSTOLIC BLOOD PRESSURE: 157 MMHG | OXYGEN SATURATION: 98 % | RESPIRATION RATE: 18 BRPM | HEIGHT: 64 IN | WEIGHT: 283 LBS | BODY MASS INDEX: 48.32 KG/M2

## 2024-03-18 DIAGNOSIS — G89.29 CHRONIC NONINTRACTABLE HEADACHE, UNSPECIFIED HEADACHE TYPE: ICD-10-CM

## 2024-03-18 DIAGNOSIS — R21 RASH: ICD-10-CM

## 2024-03-18 DIAGNOSIS — R51.9 CHRONIC NONINTRACTABLE HEADACHE, UNSPECIFIED HEADACHE TYPE: ICD-10-CM

## 2024-03-18 DIAGNOSIS — M25.50 ARTHRALGIA, UNSPECIFIED JOINT: ICD-10-CM

## 2024-03-18 DIAGNOSIS — M25.511 CHRONIC RIGHT SHOULDER PAIN: ICD-10-CM

## 2024-03-18 DIAGNOSIS — G43.809 OTHER MIGRAINE WITHOUT STATUS MIGRAINOSUS, NOT INTRACTABLE: ICD-10-CM

## 2024-03-18 DIAGNOSIS — G89.29 CHRONIC RIGHT SHOULDER PAIN: ICD-10-CM

## 2024-03-18 DIAGNOSIS — I10 ESSENTIAL (PRIMARY) HYPERTENSION: Primary | ICD-10-CM

## 2024-03-18 PROCEDURE — 3080F DIAST BP >= 90 MM HG: CPT | Performed by: INTERNAL MEDICINE

## 2024-03-18 PROCEDURE — 99214 OFFICE O/P EST MOD 30 MIN: CPT | Performed by: INTERNAL MEDICINE

## 2024-03-18 PROCEDURE — 3077F SYST BP >= 140 MM HG: CPT | Performed by: INTERNAL MEDICINE

## 2024-03-18 RX ORDER — LABETALOL 200 MG/1
400 TABLET, FILM COATED ORAL 2 TIMES DAILY
COMMUNITY
Start: 2023-09-08 | End: 2024-09-07

## 2024-03-18 RX ORDER — LABETALOL 100 MG/1
400 TABLET, FILM COATED ORAL
COMMUNITY
Start: 2023-11-13 | End: 2024-03-18

## 2024-03-18 RX ORDER — ESCITALOPRAM OXALATE 20 MG/1
20 TABLET ORAL DAILY
COMMUNITY
Start: 2023-10-13

## 2024-03-18 SDOH — ECONOMIC STABILITY: HOUSING INSECURITY
IN THE LAST 12 MONTHS, WAS THERE A TIME WHEN YOU DID NOT HAVE A STEADY PLACE TO SLEEP OR SLEPT IN A SHELTER (INCLUDING NOW)?: PATIENT DECLINED

## 2024-03-18 SDOH — ECONOMIC STABILITY: INCOME INSECURITY: HOW HARD IS IT FOR YOU TO PAY FOR THE VERY BASICS LIKE FOOD, HOUSING, MEDICAL CARE, AND HEATING?: PATIENT DECLINED

## 2024-03-18 SDOH — ECONOMIC STABILITY: FOOD INSECURITY: WITHIN THE PAST 12 MONTHS, THE FOOD YOU BOUGHT JUST DIDN'T LAST AND YOU DIDN'T HAVE MONEY TO GET MORE.: PATIENT DECLINED

## 2024-03-18 SDOH — ECONOMIC STABILITY: FOOD INSECURITY: WITHIN THE PAST 12 MONTHS, YOU WORRIED THAT YOUR FOOD WOULD RUN OUT BEFORE YOU GOT MONEY TO BUY MORE.: PATIENT DECLINED

## 2024-03-18 ASSESSMENT — PATIENT HEALTH QUESTIONNAIRE - PHQ9
3. TROUBLE FALLING OR STAYING ASLEEP: SEVERAL DAYS
4. FEELING TIRED OR HAVING LITTLE ENERGY: SEVERAL DAYS
SUM OF ALL RESPONSES TO PHQ QUESTIONS 1-9: 13
2. FEELING DOWN, DEPRESSED OR HOPELESS: NEARLY EVERY DAY
8. MOVING OR SPEAKING SO SLOWLY THAT OTHER PEOPLE COULD HAVE NOTICED. OR THE OPPOSITE, BEING SO FIGETY OR RESTLESS THAT YOU HAVE BEEN MOVING AROUND A LOT MORE THAN USUAL: SEVERAL DAYS
SUM OF ALL RESPONSES TO PHQ QUESTIONS 1-9: 13
SUM OF ALL RESPONSES TO PHQ QUESTIONS 1-9: 13
5. POOR APPETITE OR OVEREATING: SEVERAL DAYS
7. TROUBLE CONCENTRATING ON THINGS, SUCH AS READING THE NEWSPAPER OR WATCHING TELEVISION: NEARLY EVERY DAY
9. THOUGHTS THAT YOU WOULD BE BETTER OFF DEAD, OR OF HURTING YOURSELF: NOT AT ALL
6. FEELING BAD ABOUT YOURSELF - OR THAT YOU ARE A FAILURE OR HAVE LET YOURSELF OR YOUR FAMILY DOWN: NOT AT ALL
SUM OF ALL RESPONSES TO PHQ9 QUESTIONS 1 & 2: 6
SUM OF ALL RESPONSES TO PHQ QUESTIONS 1-9: 13
10. IF YOU CHECKED OFF ANY PROBLEMS, HOW DIFFICULT HAVE THESE PROBLEMS MADE IT FOR YOU TO DO YOUR WORK, TAKE CARE OF THINGS AT HOME, OR GET ALONG WITH OTHER PEOPLE: VERY DIFFICULT
1. LITTLE INTEREST OR PLEASURE IN DOING THINGS: NEARLY EVERY DAY

## 2024-03-18 NOTE — PROGRESS NOTES
\"Have you been to the ER, urgent care clinic since your last visit?  Hospitalized since your last visit?\"    NO    “Have you seen or consulted any other health care providers outside of [facility] since your last visit?”    NO     “Have you had a pap smear?”    YES - Where: [facility]. Lux vargas womens health Nurse/[facility] to request most recent records if not in the chart                 Click Here for Release of Records Request  
Rash  MEGHAN, Direct, w/Reflex      5. Chronic right shoulder pain  [facility] - Physical Therapy at [facility]      6. Chronic nonintractable headache, unspecified headache type  Sedimentation Rate        Advised low-sodium diet    Hypertension-patient sees cardiology and was placed on amlodipine 5 mg once a day.  Asked patient to take it twice a day and see if her blood pressure improves      Explained to patient risk benefits of the medications.Advised patient to stop meds if having any side effects.Pt verbalized understanding of the instructions.    I have discussed the diagnosis with the patient and the intended plan as seen in the above orders.  The patient has received an after-visit summary and questions were answered concerning future plans.  I have discussed medication side effects and warnings with the patient as well. I have reviewed the plan of care with the patient, accepted their input and they are in agreement with the treatment goals.     Reviewed plan of care. Patient has provided input and agrees with goals.    No follow-up provider specified.    Junior Oquendo MD

## 2024-03-19 LAB — ERYTHROCYTE [SEDIMENTATION RATE] IN BLOOD: 57 MM/HR (ref 0–20)

## 2024-03-20 LAB — ANA SER QL: NEGATIVE

## 2024-04-11 ENCOUNTER — HOSPITAL ENCOUNTER (OUTPATIENT)
Facility: HOSPITAL | Age: 29
Setting detail: RECURRING SERIES
Discharge: HOME | End: 2024-04-14
Payer: MEDICAID

## 2024-04-11 PROCEDURE — 97161 PT EVAL LOW COMPLEX 20 MIN: CPT

## 2024-04-11 NOTE — THERAPY EVALUATION
Physical Therapy at [facility],   a [facility] of [facility]  [address] [address]  Phone:[phone] Fax:[phone]                                                                        PHYSICAL THERAPY - MEDICARE EVALUATION/PLAN OF CARE NOTE (updated 3/23)  Date: 2024        Patient Name:  Ace Rousseau :  1995   Medical   Diagnosis:  Chronic right shoulder pain [M25.511, G89.29] Treatment Diagnosis:  M25.511  RIGHT SHOULDER PAIN    Referral Source:  Junior Oquendo MD Provider #:  [id number]                  Insurance: Payor: [employer] MEDICAID / Plan: [employer] PLAN([facility]) / Product Type: *No Product type* /      Patient  verified yes     Visit #   Current  / Total 1 24   Time   In / Out 240 330 P   Total Treatment Time 50   Total Timed Codes 0   1:1 Treatment Time 0      [facility] BC Totals Reminder:  bill using total billable   min of TIMED therapeutic procedures and modalities.   8-22 min = 1 unit; 23-37 min = 2 units; 38-52 min = 3 units;  53-67 min = 4 units; 68-82 min = 5 units     SUBJECTIVE  Pain Level (0-10 scale): 9    Any medication changes, allergies to medications, adverse drug reactions, diagnosis change, or new procedure performed?: [x] No    [] Yes (see summary sheet for update)  Medications: Verified on Patient Summary List    Subjective functional status/changes:     Start of Care: 2024  Onset date: 5-6 mo ago  Mechanism of Injury: after she had baby (pt notes that it may have been after her epidural -had 3 epidurals because first two did not take)  Pain:   10/10 max 6/10 min 9/10 now     Location of symptoms: right side of neck, radiates down arm to forearm.  Sharp pain goes to all 5 fingers  Description of symptoms: stiff  Limitations to PLOF/Activity or Recreational Limitations: lifting baby, reaching overhead, cleaning, turning head to the left, carrying baby, burping on right shoulder  Eased by:

## 2024-04-18 ENCOUNTER — APPOINTMENT (OUTPATIENT)
Facility: HOSPITAL | Age: 29
End: 2024-04-18
Payer: MEDICAID

## 2024-04-23 ENCOUNTER — HOSPITAL ENCOUNTER (OUTPATIENT)
Facility: HOSPITAL | Age: 29
Setting detail: RECURRING SERIES
Discharge: HOME | End: 2024-04-26
Payer: MEDICAID

## 2024-04-23 PROCEDURE — 97140 MANUAL THERAPY 1/> REGIONS: CPT

## 2024-04-23 PROCEDURE — 97110 THERAPEUTIC EXERCISES: CPT

## 2024-04-23 NOTE — PROGRESS NOTES
PHYSICAL THERAPY - DAILY TREATMENT NOTE (updated 3/23)      Date: 2024          Patient Name:  Ace Rousseau :  1995   Medical   Diagnosis:  Chronic right shoulder pain [M25.511, G89.29] Treatment Diagnosis:  M25.511  RIGHT SHOULDER PAIN    Referral Source:  Junior Oquendo MD Insurance:   Payor: [facility] MEDICAID / Plan: [facility] PLAN([facility]) / Product Type: *No Product type* /                     Patient  verified yes     Visit #   Current  / Total 2 24   Time   In / Out 2:10 PM 2:50 PM   Total Treatment Time 40   Total Timed Codes 30         SUBJECTIVE    Pain Level (0-10 scale): 6    Any medication changes, allergies to medications, adverse drug reactions, diagnosis change, or new procedure performed?: [x] No    [] Yes (see summary sheet for update)  Medications: Verified on Patient Summary List    Subjective functional status/changes:     Pt reports she had to cancel last week because her mother fell and passed out and was in the hospital. Pt helped her off the floor and when she did her elbow popped and she had pain, but felt more ROM.     OBJECTIVE      Therapeutic Procedures:  Tx Min Billable or 1:1 Min (if diff from Tx Min) Procedure, Rationale, Specifics   22  68149 Therapeutic Exercise (timed):  increase ROM, strength, coordination, balance, and proprioception to improve patient's ability to progress to PLOF and address remaining functional goals. (see flow sheet as applicable)     Details if applicable:     8  [id number] Manual Therapy (timed):  decrease pain, increase ROM, and increase tissue extensibility to improve patient's ability to progress to PLOF and address remaining functional goals.  The manual therapy interventions were performed at a separate and distinct time from the therapeutic activities interventions . (see flow sheet as applicable)     Details if applicable:  STM anterior & lateral deltoid. Trial of posterior shoulder mobs but increased pain and

## 2024-04-25 ENCOUNTER — APPOINTMENT (OUTPATIENT)
Facility: HOSPITAL | Age: 29
End: 2024-04-25
Payer: MEDICAID

## 2024-04-30 ENCOUNTER — HOSPITAL ENCOUNTER (OUTPATIENT)
Facility: HOSPITAL | Age: 29
Setting detail: RECURRING SERIES
Discharge: HOME | End: 2024-05-03
Payer: MEDICAID

## 2024-04-30 PROCEDURE — 97110 THERAPEUTIC EXERCISES: CPT

## 2024-04-30 NOTE — PROGRESS NOTES
PHYSICAL THERAPY - DAILY TREATMENT NOTE (updated 3/23)      Date: 2024          Patient Name:  Ace Rousseau :  1995   Medical   Diagnosis:  Chronic right shoulder pain [M25.511, G89.29] Treatment Diagnosis:  M25.511  RIGHT SHOULDER PAIN    Referral Source:  Junior Oquendo MD Insurance:   Payor: [facility] MEDICAID / Plan: [facility] PLAN([facility]) / Product Type: *No Product type* /                     Patient  verified yes     Visit #   Current  / Total 2 24   Time   In / Out 1200  1255   Total Treatment Time 55   Total Timed Codes 45         SUBJECTIVE    Pain Level (0-10 scale): 7    Any medication changes, allergies to medications, adverse drug reactions, diagnosis change, or new procedure performed?: [x] No    [] Yes (see summary sheet for update)  Medications: Verified on Patient Summary List    Subjective functional status/changes:       Pt unable to move arm at all without significant popping and pain.  Pt reports she has not had an MRI.    OBJECTIVE      Therapeutic Procedures:  Tx Min Billable or 1:1 Min (if diff from Tx Min) Procedure, Rationale, Specifics   45  74377 Therapeutic Exercise (timed):  increase ROM, strength, coordination, balance, and proprioception to improve patient's ability to progress to PLOF and address remaining functional goals. (see flow sheet as applicable)     Details if applicable:     -  83727 Manual Therapy (timed):  decrease pain, increase ROM, and increase tissue extensibility to improve patient's ability to progress to PLOF and address remaining functional goals.  The manual therapy interventions were performed at a separate and distinct time from the therapeutic activities interventions . (see flow sheet as applicable)     Details if applicable:  STM anterior & lateral deltoid. Trial of posterior shoulder mobs but increased pain and stopped. Gentle flexion PROM - very brief         Details if applicable:     45     Total Total

## 2024-05-21 ENCOUNTER — OFFICE VISIT (OUTPATIENT)
Dept: PRIMARY CARE CLINIC | Facility: CLINIC | Age: 29
End: 2024-05-21
Payer: MEDICAID

## 2024-05-21 VITALS
OXYGEN SATURATION: 98 % | DIASTOLIC BLOOD PRESSURE: 124 MMHG | RESPIRATION RATE: 18 BRPM | TEMPERATURE: 98 F | WEIGHT: 286 LBS | SYSTOLIC BLOOD PRESSURE: 160 MMHG | HEART RATE: 76 BPM | BODY MASS INDEX: 48.83 KG/M2 | HEIGHT: 64 IN

## 2024-05-21 DIAGNOSIS — E55.9 VITAMIN D DEFICIENCY: ICD-10-CM

## 2024-05-21 DIAGNOSIS — D64.9 ANEMIA, UNSPECIFIED TYPE: ICD-10-CM

## 2024-05-21 DIAGNOSIS — I10 ESSENTIAL (PRIMARY) HYPERTENSION: Primary | ICD-10-CM

## 2024-05-21 DIAGNOSIS — M25.50 ARTHRALGIA, UNSPECIFIED JOINT: ICD-10-CM

## 2024-05-21 DIAGNOSIS — J45.20 MILD INTERMITTENT ASTHMA WITHOUT COMPLICATION: ICD-10-CM

## 2024-05-21 DIAGNOSIS — D64.9 ANEMIA, UNSPECIFIED TYPE: Primary | ICD-10-CM

## 2024-05-21 LAB
25(OH)D3 SERPL-MCNC: 12.6 NG/ML (ref 30–100)
ERYTHROCYTE [DISTWIDTH] IN BLOOD BY AUTOMATED COUNT: 16.1 % (ref 11.5–14.5)
ERYTHROCYTE [SEDIMENTATION RATE] IN BLOOD: 47 MM/HR (ref 0–20)
FERRITIN SERPL-MCNC: 6 NG/ML (ref 26–388)
HCT VFR BLD AUTO: 34.2 % (ref 35–47)
HGB BLD-MCNC: 10.5 G/DL (ref 11.5–16)
MCH RBC QN AUTO: 23.1 PG (ref 26–34)
MCHC RBC AUTO-ENTMCNC: 30.7 G/DL (ref 30–36.5)
MCV RBC AUTO: 75.2 FL (ref 80–99)
NRBC # BLD: 0 K/UL (ref 0–0.01)
NRBC BLD-RTO: 0 PER 100 WBC
PLATELET # BLD AUTO: 338 K/UL (ref 150–400)
PMV BLD AUTO: 9.5 FL (ref 8.9–12.9)
RBC # BLD AUTO: 4.55 M/UL (ref 3.8–5.2)
WBC # BLD AUTO: 6.9 K/UL (ref 3.6–11)

## 2024-05-21 PROCEDURE — 3077F SYST BP >= 140 MM HG: CPT | Performed by: INTERNAL MEDICINE

## 2024-05-21 PROCEDURE — 99214 OFFICE O/P EST MOD 30 MIN: CPT | Performed by: INTERNAL MEDICINE

## 2024-05-21 PROCEDURE — 3080F DIAST BP >= 90 MM HG: CPT | Performed by: INTERNAL MEDICINE

## 2024-05-21 RX ORDER — ERGOCALCIFEROL 1.25 MG/1
50000 CAPSULE ORAL WEEKLY
Qty: 12 CAPSULE | Refills: 1 | Status: SHIPPED | OUTPATIENT
Start: 2024-05-21

## 2024-05-21 RX ORDER — CELECOXIB 100 MG/1
100 CAPSULE ORAL DAILY
Qty: 90 CAPSULE | Refills: 1 | Status: SHIPPED | OUTPATIENT
Start: 2024-05-21

## 2024-05-21 RX ORDER — FERROUS SULFATE 325(65) MG
325 TABLET ORAL 2 TIMES DAILY
Qty: 180 TABLET | Refills: 1 | Status: SHIPPED | OUTPATIENT
Start: 2024-05-21

## 2024-05-21 RX ORDER — HYDROCHLOROTHIAZIDE 25 MG/1
25 TABLET ORAL EVERY MORNING
Qty: 90 TABLET | Refills: 1 | Status: SHIPPED | OUTPATIENT
Start: 2024-05-21

## 2024-05-21 NOTE — PROGRESS NOTES
Ace Rousseau is a 28 y.o. female and presents with     Chief Complaint   Patient presents with    Dizziness     Patient reports she has been having vertigo.  Since she gave birth, has increased.  Patient asking for recommendations for a medication;because amlodipine is not working with labetalol        History of Present Illness  `      The patient presents for evaluation of multiple medical concerns.    The patient gave birth to her baby in 10/2023 and has been experiencing dizziness and vertigo since then. This symptom predates the delivery of her baby, which was 3 months premature due to preeclampsia. She was hospitalized twice post-birth due to postpartum preeclampsia and was initially prescribed nifedipine. However, during her last visit, she was taken off nifedipine due to severe gum side effects. Currently, she is on a regimen of amlodipine and labetalol 400 mg, administered three times daily, but her blood pressure remains elevated and her vertigo persists. Despite adhering to a twice-daily regimen of amlodipine, her blood pressure remains elevated. She is currently breastfeeding and has an appointment with her cardiologist in late 06/2024 and is seeking adjustments to her medication regimen.    The patient's right-sided body pain has intensified following an epidural injection in her back. Despite attending physical therapy for her back, she was informed that a referral could not be provided. She reports worsening pain in her arm, shoulder, and back. She is seeking a referral for physical therapy. Despite taking Tylenol and ibuprofen, she reports no relief. She has a history of chronic back pain and was under the care of Dr. Cheung, a rheumatologist, in 2021 or 2023. She is requesting a referral to a different rheumatologist.    The patient is requesting a referral for a new nebulizer kit for her asthma. She reports that the one she has used for years has been ineffective.           Past Medical

## 2024-05-21 NOTE — PROGRESS NOTES
\"Have you been to the ER, urgent care clinic since your last visit?  Hospitalized since your last visit?\"    YES - When: approximately 2 months ago.  Where and Why: [facility].    “Have you seen or consulted any other health care providers outside of [facility] since your last visit?”    NO     “Have you had a pap smear?”    YES - Where: [facility] Nurse/JONNA to request most recent records if not in the chart    No cervical cancer screening on file             Click Here for Release of Records Request

## 2024-05-22 LAB — CCP IGA+IGG SERPL IA-ACNC: 6 UNITS (ref 0–19)

## 2024-05-23 LAB — ANA SER QL: NEGATIVE

## 2024-06-18 ENCOUNTER — OFFICE VISIT (OUTPATIENT)
Dept: PRIMARY CARE CLINIC | Facility: CLINIC | Age: 29
End: 2024-06-18
Payer: MEDICAID

## 2024-06-18 VITALS
HEART RATE: 76 BPM | HEIGHT: 64 IN | RESPIRATION RATE: 18 BRPM | WEIGHT: 284 LBS | BODY MASS INDEX: 48.49 KG/M2 | TEMPERATURE: 97.8 F | SYSTOLIC BLOOD PRESSURE: 162 MMHG | OXYGEN SATURATION: 98 % | DIASTOLIC BLOOD PRESSURE: 122 MMHG

## 2024-06-18 DIAGNOSIS — G89.29 CHRONIC RIGHT SHOULDER PAIN: ICD-10-CM

## 2024-06-18 DIAGNOSIS — G89.29 CHRONIC MIDLINE LOW BACK PAIN, UNSPECIFIED WHETHER SCIATICA PRESENT: ICD-10-CM

## 2024-06-18 DIAGNOSIS — E55.9 VITAMIN D DEFICIENCY: ICD-10-CM

## 2024-06-18 DIAGNOSIS — M54.50 CHRONIC MIDLINE LOW BACK PAIN, UNSPECIFIED WHETHER SCIATICA PRESENT: ICD-10-CM

## 2024-06-18 DIAGNOSIS — I10 ESSENTIAL (PRIMARY) HYPERTENSION: Primary | ICD-10-CM

## 2024-06-18 DIAGNOSIS — D64.9 ANEMIA, UNSPECIFIED TYPE: ICD-10-CM

## 2024-06-18 DIAGNOSIS — M25.50 ARTHRALGIA, UNSPECIFIED JOINT: ICD-10-CM

## 2024-06-18 DIAGNOSIS — F32.A DEPRESSION, UNSPECIFIED DEPRESSION TYPE: ICD-10-CM

## 2024-06-18 DIAGNOSIS — M25.511 CHRONIC RIGHT SHOULDER PAIN: ICD-10-CM

## 2024-06-18 PROCEDURE — 3080F DIAST BP >= 90 MM HG: CPT | Performed by: INTERNAL MEDICINE

## 2024-06-18 PROCEDURE — 99214 OFFICE O/P EST MOD 30 MIN: CPT | Performed by: INTERNAL MEDICINE

## 2024-06-18 PROCEDURE — 3077F SYST BP >= 140 MM HG: CPT | Performed by: INTERNAL MEDICINE

## 2024-06-18 RX ORDER — TRAZODONE HYDROCHLORIDE 50 MG/1
50 TABLET ORAL
COMMUNITY

## 2024-06-18 RX ORDER — VALSARTAN 160 MG/1
160 TABLET ORAL DAILY
Qty: 90 TABLET | Refills: 1 | Status: SHIPPED | OUTPATIENT
Start: 2024-06-18

## 2024-06-18 RX ORDER — FERROUS SULFATE 325(65) MG
325 TABLET ORAL 2 TIMES DAILY
Qty: 180 TABLET | Refills: 1 | Status: SHIPPED | OUTPATIENT
Start: 2024-06-18

## 2024-06-18 RX ORDER — ERGOCALCIFEROL 1.25 MG/1
50000 CAPSULE ORAL WEEKLY
Qty: 12 CAPSULE | Refills: 1 | Status: SHIPPED | OUTPATIENT
Start: 2024-06-18

## 2024-06-18 RX ORDER — AMLODIPINE BESYLATE 5 MG/1
5 TABLET ORAL DAILY
COMMUNITY
Start: 2024-05-21

## 2024-06-18 NOTE — PROGRESS NOTES
Ace Rousseau is a 28 y.o. female and presents with     Chief Complaint   Patient presents with    Follow-up     Patient is unsure if BP medication is working. A few days ago, patients right side of her body is hurting more. Right hand and fingers become numb and swollen. Tingling feeling is painful.        History of Present Illness  The patient presents for evaluation of multiple medical concerns.    The patient's prescription for celecoxib was not approved by her insurance. She reports experiencing tingling sensations in her hands, accompanied by swelling in her right hand. Her right arm pain has intensified. Despite the administration of vitamin D, she has not observed any improvement in her back pain. She is not currently under the care of a specialist for her aches and pains. She requires additional information regarding the referral that was previously made, as she lost the paperwork and has not received a response.    Supplemental Information  She has had 1 child and she has had very many miscarriages in the past. Her psychiatrist prescribed her trazodone 50 mg as needed. She is also on Lexapro. She stopped breastfeeding last week. She does not have a prescription for iron tablets, but she has finished her vitamin D.     Patient has stopped breast feeding as she is not getting enough breastmilk for over 10 days      Past Medical History:   Diagnosis Date    Asthma     Depression     has been on meds since 1/11    Headache     Hypertension     Morbid obesity (HCC)     Musculoskeletal disorder     Psychiatric disorder     Seasonal allergies      Past Surgical History:   Procedure Laterality Date    ADENOIDECTOMY      DILATION AND CURETTAGE OF UTERUS      GYN  02/2020    ovarian cyst removal     HEENT      jaw surgery    TONSILLECTOMY       Current Outpatient Medications   Medication Sig    amLODIPine (NORVASC) 5 MG tablet Take 1 tablet by mouth daily    traZODone (DESYREL) 50 MG tablet Take 1 tablet by

## 2024-06-18 NOTE — PROGRESS NOTES
Health Decision Maker has been checked with the patient      AI form was signed    Chief Complaint   Patient presents with    Follow-up     Patient is unsure if BP medication is working. A few days ago, patients right side of her body is hurting more. Right hand and fingers become numb and swollen. Tingling feeling is painful.        \"Have you been to the ER, urgent care clinic since your last visit?  Hospitalized since your last visit?\"    NO    “Have you seen or consulted any other health care providers outside of [facility] since your last visit?”    NO      There were no vitals filed for this visit.   Depression: At risk (3/18/2024)    PHQ-2     PHQ-2 Score: 13       “Have you had a pap smear?”    NO    No cervical cancer screening on file             Click Here for Release of Records Request    Chart reviewed: immunizations are documented.     There is no immunization history on file for this patient.

## 2024-07-16 ENCOUNTER — HOSPITAL ENCOUNTER (OUTPATIENT)
Facility: HOSPITAL | Age: 29
Setting detail: RECURRING SERIES
Discharge: HOME | End: 2024-07-19
Payer: MEDICAID

## 2024-07-16 PROCEDURE — 97110 THERAPEUTIC EXERCISES: CPT

## 2024-07-16 PROCEDURE — 97162 PT EVAL MOD COMPLEX 30 MIN: CPT

## 2024-07-16 NOTE — THERAPY EVALUATION
Physical Therapy at [facility],   a part of [facility]  [address] [address]  Phone:[phone] Fax:[phone]                                                                            PHYSICAL THERAPY - MEDICARE EVALUATION/PLAN OF CARE NOTE (updated 3/23)      Date: 2024          Patient Name:  Ace Rousseau :  1995   Medical   Diagnosis:  Chronic right shoulder pain [M25.511, G89.29]  Chronic midline low back pain, unspecified whether sciatica present [M54.50, G89.29] Treatment Diagnosis:  M25.511  RIGHT SHOULDER PAIN  and M54.59, G89.29  CHRONIC LOWER BACK PAIN    Referral Source:  Junior Oquendo MD Provider #:  [id number]                  Insurance: Payor: [employer] MEDICAID / Plan: [employer] [facility] CARDINAL CARE / Product Type: *No Product type* /      Patient  verified yes     Visit #   Current  / Total 1 MN   Time   In / Out 12:35 pm 1:15 pm   Total Treatment Time 40   Total Timed Codes 10   1:1 Treatment Time 10      [facility] BC Totals Reminder:  bill using total billable   min of TIMED therapeutic procedures and modalities.   8-22 min = 1 unit; 23-37 min = 2 units; 38-52 min = 3 units;  53-67 min = 4 units; 68-82 min = 5 units           SUBJECTIVE  Pain Level (0-10 scale): 10  []constant []intermittent []improving []worsening []no change since onset    Any medication changes, allergies to medications, adverse drug reactions, diagnosis change, or new procedure performed?: [x] No    [] Yes (see summary sheet for update)  Medications: Verified on Patient Summary List    Subjective functional status/changes:     Pt complains of R LE and UE numbness and weakness since her epidurals (took 3 tries to get one that worked) for the birth of her son 9 months ago. Pt states since then she has experienced severe pain since then. Pt has upcoming neuro appt in 1 month.     Start of Care: 2024  Onset Date: 9 months ago  Current

## 2024-07-25 ENCOUNTER — HOSPITAL ENCOUNTER (OUTPATIENT)
Facility: HOSPITAL | Age: 29
Setting detail: RECURRING SERIES
Discharge: HOME | End: 2024-07-28
Payer: MEDICAID

## 2024-07-25 PROCEDURE — 97110 THERAPEUTIC EXERCISES: CPT

## 2024-07-25 NOTE — PROGRESS NOTES
postural abnormalities, and instruct in home and community integration to address functional deficits and attain remaining goals.    Progress toward goals / Updated goals:  []  See Progress Note/Recertification    NT      PLAN  Yes  Continue plan of care  Re-Cert Due: -  []  Upgrade activities as tolerated  []  Discharge due to:  []  Other:      Zulema Villegas, PTA       7/25/2024       1:18 PM

## 2024-08-01 ENCOUNTER — HOSPITAL ENCOUNTER (OUTPATIENT)
Facility: HOSPITAL | Age: 29
Setting detail: RECURRING SERIES
Discharge: HOME | End: 2024-08-04
Payer: MEDICAID

## 2024-08-01 PROCEDURE — 97110 THERAPEUTIC EXERCISES: CPT

## 2024-08-01 NOTE — PROGRESS NOTES
PHYSICAL THERAPY - DAILY TREATMENT NOTE (updated 3/23)      Date: 2024          Patient Name:  Ace Rousseau :  1995   Medical   Diagnosis:  Chronic right shoulder pain [M25.511, G89.29]  Chronic midline low back pain, unspecified whether sciatica present [M54.50, G89.29] Treatment Diagnosis:  M25.511  RIGHT SHOULDER PAIN  and M54.59, G89.29  CHRONIC LOWER BACK PAIN    Referral Source:  Junior Oquendo MD Insurance:   [facility] / [facility]: [facility] [employer] [facility] CARDINAL CARE / Product Type: *No Product type* /                     Patient  verified yes     Visit #   Current  / Total 3 24   Time   In / Out 1:00 PM 1:50 PM   Total Treatment Time 50   Total Timed Codes 40         SUBJECTIVE    Pain Level (0-10 scale): 10    Any medication changes, allergies to medications, adverse drug reactions, diagnosis change, or new procedure performed?: [x] No    [] Yes (see summary sheet for update)  Medications: Verified on Patient Summary List    Subjective functional status/changes:     Patient reports \"It took about a week to get over the soreness last visit\" States she has a sharp pain in her R neck and her low back today. States she has been using ice at home which helps.     OBJECTIVE      Therapeutic Procedures:  Tx Min Billable or 1:1 Min (if diff from Tx Min) Procedure, Rationale, Specifics   40  13194 Therapeutic Exercise (timed):  increase ROM, strength, coordination, balance, and proprioception to improve patient's ability to progress to PLOF and address remaining functional goals. (see flow sheet as applicable)     Details if applicable:       50678 Manual Therapy (timed):  decrease pain, increase ROM, increase tissue extensibility, and decrease trigger points to improve patient's ability to progress to PLOF and address remaining functional goals.  The manual therapy interventions were performed at a separate and distinct time from the therapeutic activities interventions . (see

## 2024-08-06 ENCOUNTER — APPOINTMENT (OUTPATIENT)
Facility: HOSPITAL | Age: 29
End: 2024-08-06
Payer: MEDICAID

## 2024-08-27 ENCOUNTER — APPOINTMENT (OUTPATIENT)
Facility: HOSPITAL | Age: 29
End: 2024-08-27
Payer: MEDICAID

## 2024-10-21 ENCOUNTER — OFFICE VISIT (OUTPATIENT)
Age: 29
End: 2024-10-21
Payer: MEDICAID

## 2024-10-21 VITALS
TEMPERATURE: 95.1 F | WEIGHT: 283 LBS | DIASTOLIC BLOOD PRESSURE: 80 MMHG | SYSTOLIC BLOOD PRESSURE: 130 MMHG | HEART RATE: 78 BPM | BODY MASS INDEX: 48.32 KG/M2 | OXYGEN SATURATION: 98 % | HEIGHT: 64 IN

## 2024-10-21 DIAGNOSIS — R51.9 INTRACTABLE HEADACHE, UNSPECIFIED CHRONICITY PATTERN, UNSPECIFIED HEADACHE TYPE: Primary | ICD-10-CM

## 2024-10-21 PROCEDURE — 3075F SYST BP GE 130 - 139MM HG: CPT | Performed by: PSYCHIATRY & NEUROLOGY

## 2024-10-21 PROCEDURE — 3079F DIAST BP 80-89 MM HG: CPT | Performed by: PSYCHIATRY & NEUROLOGY

## 2024-10-21 PROCEDURE — 99205 OFFICE O/P NEW HI 60 MIN: CPT | Performed by: PSYCHIATRY & NEUROLOGY

## 2024-10-21 RX ORDER — LABETALOL 200 MG/1
200 TABLET, FILM COATED ORAL 2 TIMES DAILY
COMMUNITY

## 2024-10-21 RX ORDER — ONDANSETRON 4 MG/1
4 TABLET, ORALLY DISINTEGRATING ORAL 3 TIMES DAILY PRN
Qty: 30 TABLET | Refills: 1 | Status: SHIPPED | OUTPATIENT
Start: 2024-10-21

## 2024-10-21 RX ORDER — RIMEGEPANT SULFATE 75 MG/75MG
1 TABLET, ORALLY DISINTEGRATING ORAL PRN
Qty: 8 TABLET | Refills: 3 | Status: SHIPPED | OUTPATIENT
Start: 2024-10-21

## 2024-10-21 RX ORDER — CARIPRAZINE 1.5 MG/1
CAPSULE, GELATIN COATED ORAL
COMMUNITY
Start: 2024-07-18

## 2024-10-21 RX ORDER — TOPIRAMATE 25 MG/1
25 TABLET, FILM COATED ORAL NIGHTLY
Qty: 30 TABLET | Refills: 5 | Status: SHIPPED | OUTPATIENT
Start: 2024-10-21

## 2024-10-21 NOTE — PROGRESS NOTES
NEUROLOGY NEW PATIENT CONSULTATION      10/21/2024    RE: Ace Rousseau    REFERRED BY:  Junior Oquendo MD    CHIEF COMPLAINT:  This is Ace Rousseau is a 29 y.o. female  who had concerns including New Patient and Dizziness.    HPI:     Oct 7, 2023, patient had a baby boy premature due to preeclampsia. Since then, patient noted increased headaches, daily headache, (+) photophobia (+) nausea (+) phonophobia (+) visual auras- black dots (+) dizziness.    Not sleeping well due to baby.      Patient seen at [facility] neurology [facility]      Patient seen by Dr Haile in 2021 for migraines since 7 yo treated with Emgality, Effexor, Propranolol, Aimovig, Gabapentin, Amitriptyline, Maxalt, Sumatriptan      MRI/MRA/MRV brain from [facility] (9/6/23):   1.  No evidence of ischemia or other acute intracranial abnormality.   2.  Unremarkable unenhanced MRV of the head.   3.  Paranasal sinus disease as described.           History of Present Illness          Review of Systems  (-) fever  (-) rash  All other systems reviewed and are negative    PMH  Past Medical History:   Diagnosis Date    Asthma     Depression     has been on meds since 1/11    Headache     Hypertension     Morbid obesity     Musculoskeletal disorder     Psychiatric disorder     Seasonal allergies        Social Hx  Social History     Socioeconomic History    Marital status: Single   Tobacco Use    Smoking status: Some Days     Current packs/day: 0.05     Average packs/day: 0.1 packs/day for 18.3 years (0.9 ttl pk-yrs)     Types: Cigarettes     Start date: 7/13/2006    Smokeless tobacco: Never    Tobacco comments:     Quit smoking: pt smokes a pack of ciggerattes a week.   Substance and Sexual Activity    Alcohol use: Not Currently    Drug use: Yes     Types: Marijuana (Weed)     Social Determinants of Health     Financial Resource Strain: Patient Declined (3/18/2024)    Overall Financial Resource Strain (CARDIA)     Difficulty of Paying Living Expenses: Patient

## 2024-11-11 ENCOUNTER — TELEPHONE (OUTPATIENT)
Age: 29
End: 2024-11-11

## 2024-11-11 NOTE — TELEPHONE ENCOUNTER
RE:[employer]      Case submitted via EPIC was denied. Awaiting denial letter      Nurse notified

## 2025-02-17 ENCOUNTER — HOSPITAL ENCOUNTER (EMERGENCY)
Facility: HOSPITAL | Age: 30
Discharge: HOME | End: 2025-02-17
Attending: EMERGENCY MEDICINE
Payer: MEDICAID

## 2025-02-17 VITALS
BODY MASS INDEX: 48.06 KG/M2 | OXYGEN SATURATION: 99 % | DIASTOLIC BLOOD PRESSURE: 116 MMHG | WEIGHT: 279.98 LBS | RESPIRATION RATE: 20 BRPM | SYSTOLIC BLOOD PRESSURE: 159 MMHG | HEART RATE: 82 BPM | TEMPERATURE: 98.4 F

## 2025-02-17 DIAGNOSIS — E86.0 MILD DEHYDRATION: Primary | ICD-10-CM

## 2025-02-17 DIAGNOSIS — A08.4 VIRAL GASTROENTERITIS: ICD-10-CM

## 2025-02-17 PROCEDURE — 6370000000 HC RX 637 (ALT 250 FOR IP): Performed by: EMERGENCY MEDICINE

## 2025-02-17 PROCEDURE — 99283 EMERGENCY DEPT VISIT LOW MDM: CPT

## 2025-02-17 RX ORDER — PRAZOSIN HYDROCHLORIDE 5 MG/1
10 CAPSULE ORAL NIGHTLY
COMMUNITY

## 2025-02-17 RX ORDER — ONDANSETRON 4 MG/1
4 TABLET, ORALLY DISINTEGRATING ORAL 3 TIMES DAILY PRN
Qty: 6 TABLET | Refills: 0 | Status: SHIPPED | OUTPATIENT
Start: 2025-02-17

## 2025-02-17 RX ORDER — ONDANSETRON 4 MG/1
4 TABLET, ORALLY DISINTEGRATING ORAL ONCE
Status: COMPLETED | OUTPATIENT
Start: 2025-02-17 | End: 2025-02-17

## 2025-02-17 RX ADMIN — ONDANSETRON 4 MG: 4 TABLET, ORALLY DISINTEGRATING ORAL at 12:28

## 2025-02-18 NOTE — ED PROVIDER NOTES
[facility] PEDIATRIC EMERGENCY DEPARTMENT  EMERGENCY DEPARTMENT ENCOUNTER      Pt Name: Ace Rousseau  MRN: [id number]  Birthdate 1995  Date of evaluation: 2/17/2025  Provider: Alexander Aguilar MD    CHIEF COMPLAINT       Chief Complaint   Patient presents with    Nausea    Dizziness    Diarrhea       ALLERGIES     Latex, Penicillins, and Witch hazel    ENCOUNTER     HISTORY OF PRESENT ILLNESS:    A 29-year-old female with a past medical history significant for hypertension, obesity, and asthma presented to the Emergency Department. The history was provided by the patient herself. She reports experiencing dizziness, nausea, diarrhea, and vomiting episodes for about a week. She denies awareness of any fevers. Her son has also been experiencing diarrhea for the last three days. The patient took her hypertension medication this morning but may have vomited it up.    PAST MEDICAL HISTORY:    - History of hypertension, obesity, and asthma.    PHYSICAL EXAM:    - General Appearance: Well-appearing, no acute distress.  - Head, Eyes, Ears, Nose, Throat (HEENT): Normocephalic, atraumatic. No signs of dehydration such as dry mucous membranes.  - Neck: Supple, no lymphadenopathy.  - Cardiovascular: Regular rate and rhythm, no murmurs, rubs, or gallops.  - Respiratory: Clear to auscultation bilaterally, no wheezes, rales, or rhonchi.  - Gastrointestinal: Soft, non-distended, no abdominal tenderness or guarding.  - Musculoskeletal: Normal range of motion, no tenderness.  - Skin: Warm, dry, good turgor.  - Neurological: Alert and oriented, no focal neurological deficits.  - Psychiatric: Appropriate affect and demeanor.    SUMMARY:    A 29-year-old female presented with dizziness, nausea, diarrhea, and vomiting episodes ongoing for about a week. Suspected viral gastroenteritis with mild dehydration was diagnosed, considering her son also experienced similar symptoms. The patient was well-appearing with good clinical

## 2025-02-21 ENCOUNTER — APPOINTMENT (OUTPATIENT)
Facility: HOSPITAL | Age: 30
End: 2025-02-21
Payer: MEDICAID

## 2025-02-21 ENCOUNTER — HOSPITAL ENCOUNTER (EMERGENCY)
Facility: HOSPITAL | Age: 30
Discharge: HOME | End: 2025-02-21
Payer: MEDICAID

## 2025-02-21 VITALS
WEIGHT: 279 LBS | HEART RATE: 90 BPM | DIASTOLIC BLOOD PRESSURE: 93 MMHG | TEMPERATURE: 97.9 F | OXYGEN SATURATION: 97 % | RESPIRATION RATE: 19 BRPM | BODY MASS INDEX: 47.63 KG/M2 | HEIGHT: 64 IN | SYSTOLIC BLOOD PRESSURE: 157 MMHG

## 2025-02-21 DIAGNOSIS — I10 ESSENTIAL HYPERTENSION: ICD-10-CM

## 2025-02-21 DIAGNOSIS — R51.9 ACUTE NONINTRACTABLE HEADACHE, UNSPECIFIED HEADACHE TYPE: Primary | ICD-10-CM

## 2025-02-21 DIAGNOSIS — H81.10 BENIGN PAROXYSMAL POSITIONAL VERTIGO, UNSPECIFIED LATERALITY: ICD-10-CM

## 2025-02-21 LAB
ALBUMIN SERPL-MCNC: 3.1 G/DL (ref 3.5–5)
ALBUMIN/GLOB SERPL: 0.7 (ref 1.1–2.2)
ALP SERPL-CCNC: 142 U/L (ref 45–117)
ALT SERPL-CCNC: 18 U/L (ref 12–78)
ANION GAP SERPL CALC-SCNC: 7 MMOL/L (ref 2–12)
APPEARANCE UR: ABNORMAL
AST SERPL-CCNC: 17 U/L (ref 15–37)
BACTERIA URNS QL MICRO: NEGATIVE /HPF
BASOPHILS # BLD: 0.03 K/UL (ref 0–0.1)
BASOPHILS NFR BLD: 0.3 % (ref 0–1)
BILIRUB SERPL-MCNC: 0.3 MG/DL (ref 0.2–1)
BILIRUB UR QL: NEGATIVE
BUN SERPL-MCNC: 8 MG/DL (ref 6–20)
BUN/CREAT SERPL: 8 (ref 12–20)
CALCIUM SERPL-MCNC: 9.1 MG/DL (ref 8.5–10.1)
CHLORIDE SERPL-SCNC: 104 MMOL/L (ref 97–108)
CO2 SERPL-SCNC: 31 MMOL/L (ref 21–32)
COLOR UR: ABNORMAL
CREAT SERPL-MCNC: 0.97 MG/DL (ref 0.55–1.02)
DIFFERENTIAL METHOD BLD: ABNORMAL
EKG ATRIAL RATE: 78 BPM
EKG DIAGNOSIS: NORMAL
EKG P AXIS: 42 DEGREES
EKG P-R INTERVAL: 196 MS
EKG Q-T INTERVAL: 368 MS
EKG QRS DURATION: 92 MS
EKG QTC CALCULATION (BAZETT): 419 MS
EKG R AXIS: -1 DEGREES
EKG T AXIS: -26 DEGREES
EKG VENTRICULAR RATE: 78 BPM
EOSINOPHIL # BLD: 0.14 K/UL (ref 0–0.4)
EOSINOPHIL NFR BLD: 1.4 % (ref 0–7)
EPITH CASTS URNS QL MICRO: ABNORMAL /LPF
ERYTHROCYTE [DISTWIDTH] IN BLOOD BY AUTOMATED COUNT: 16.8 % (ref 11.5–14.5)
GLOBULIN SER CALC-MCNC: 4.7 G/DL (ref 2–4)
GLUCOSE SERPL-MCNC: 63 MG/DL (ref 65–100)
GLUCOSE UR STRIP.AUTO-MCNC: NEGATIVE MG/DL
HCG UR QL: NEGATIVE
HCT VFR BLD AUTO: 36.9 % (ref 35–47)
HGB BLD-MCNC: 12.1 G/DL (ref 11.5–16)
HGB UR QL STRIP: NEGATIVE
IMM GRANULOCYTES # BLD AUTO: 0.02 K/UL (ref 0–0.04)
IMM GRANULOCYTES NFR BLD AUTO: 0.2 % (ref 0–0.5)
KETONES UR QL STRIP.AUTO: ABNORMAL MG/DL
LEUKOCYTE ESTERASE UR QL STRIP.AUTO: NEGATIVE
LYMPHOCYTES # BLD: 3.23 K/UL (ref 0.8–3.5)
LYMPHOCYTES NFR BLD: 31.3 % (ref 12–49)
MAGNESIUM SERPL-MCNC: 1.7 MG/DL (ref 1.6–2.4)
MCH RBC QN AUTO: 25.9 PG (ref 26–34)
MCHC RBC AUTO-ENTMCNC: 32.8 G/DL (ref 30–36.5)
MCV RBC AUTO: 79 FL (ref 80–99)
MONOCYTES # BLD: 0.65 K/UL (ref 0–1)
MONOCYTES NFR BLD: 6.3 % (ref 5–13)
NEUTS SEG # BLD: 6.24 K/UL (ref 1.8–8)
NEUTS SEG NFR BLD: 60.5 % (ref 32–75)
NITRITE UR QL STRIP.AUTO: NEGATIVE
NRBC # BLD: 0 K/UL (ref 0–0.01)
NRBC BLD-RTO: 0 PER 100 WBC
PH UR STRIP: 6 (ref 5–8)
PLATELET # BLD AUTO: 358 K/UL (ref 150–400)
PMV BLD AUTO: 8.8 FL (ref 8.9–12.9)
POTASSIUM SERPL-SCNC: 3.1 MMOL/L (ref 3.5–5.1)
PROT SERPL-MCNC: 7.8 G/DL (ref 6.4–8.2)
PROT UR STRIP-MCNC: 300 MG/DL
RBC # BLD AUTO: 4.67 M/UL (ref 3.8–5.2)
RBC #/AREA URNS HPF: ABNORMAL /HPF (ref 0–5)
SODIUM SERPL-SCNC: 142 MMOL/L (ref 136–145)
SP GR UR REFRACTOMETRY: 1.02
TROPONIN I SERPL HS-MCNC: 20 NG/L (ref 0–51)
URINE CULTURE IF INDICATED: ABNORMAL
UROBILINOGEN UR QL STRIP.AUTO: 0.2 EU/DL (ref 0.2–1)
WBC # BLD AUTO: 10.3 K/UL (ref 3.6–11)
WBC URNS QL MICRO: ABNORMAL /HPF (ref 0–4)

## 2025-02-21 PROCEDURE — 84484 ASSAY OF TROPONIN QUANT: CPT

## 2025-02-21 PROCEDURE — 81001 URINALYSIS AUTO W/SCOPE: CPT

## 2025-02-21 PROCEDURE — 93005 ELECTROCARDIOGRAM TRACING: CPT | Performed by: PHYSICIAN ASSISTANT

## 2025-02-21 PROCEDURE — 70450 CT HEAD/BRAIN W/O DYE: CPT

## 2025-02-21 PROCEDURE — 96375 TX/PRO/DX INJ NEW DRUG ADDON: CPT

## 2025-02-21 PROCEDURE — 99284 EMERGENCY DEPT VISIT MOD MDM: CPT

## 2025-02-21 PROCEDURE — 81025 URINE PREGNANCY TEST: CPT

## 2025-02-21 PROCEDURE — 6370000000 HC RX 637 (ALT 250 FOR IP): Performed by: PHYSICIAN ASSISTANT

## 2025-02-21 PROCEDURE — 6360000002 HC RX W HCPCS: Performed by: PHYSICIAN ASSISTANT

## 2025-02-21 PROCEDURE — 36415 COLL VENOUS BLD VENIPUNCTURE: CPT

## 2025-02-21 PROCEDURE — 83735 ASSAY OF MAGNESIUM: CPT

## 2025-02-21 PROCEDURE — 85025 COMPLETE CBC W/AUTO DIFF WBC: CPT

## 2025-02-21 PROCEDURE — 2580000003 HC RX 258: Performed by: PHYSICIAN ASSISTANT

## 2025-02-21 PROCEDURE — 96374 THER/PROPH/DIAG INJ IV PUSH: CPT

## 2025-02-21 PROCEDURE — 80053 COMPREHEN METABOLIC PANEL: CPT

## 2025-02-21 RX ORDER — DIPHENHYDRAMINE HYDROCHLORIDE 50 MG/ML
25 INJECTION INTRAMUSCULAR; INTRAVENOUS
Status: DISCONTINUED | OUTPATIENT
Start: 2025-02-21 | End: 2025-02-21 | Stop reason: HOSPADM

## 2025-02-21 RX ORDER — PROCHLORPERAZINE EDISYLATE 5 MG/ML
10 INJECTION INTRAMUSCULAR; INTRAVENOUS ONCE
Status: COMPLETED | OUTPATIENT
Start: 2025-02-21 | End: 2025-02-21

## 2025-02-21 RX ORDER — POTASSIUM CHLORIDE 750 MG/1
40 TABLET, EXTENDED RELEASE ORAL ONCE
Status: COMPLETED | OUTPATIENT
Start: 2025-02-21 | End: 2025-02-21

## 2025-02-21 RX ORDER — DEXAMETHASONE SODIUM PHOSPHATE 10 MG/ML
10 INJECTION, SOLUTION INTRAMUSCULAR; INTRAVENOUS ONCE
Status: COMPLETED | OUTPATIENT
Start: 2025-02-21 | End: 2025-02-21

## 2025-02-21 RX ORDER — 0.9 % SODIUM CHLORIDE 0.9 %
500 INTRAVENOUS SOLUTION INTRAVENOUS ONCE
Status: COMPLETED | OUTPATIENT
Start: 2025-02-21 | End: 2025-02-21

## 2025-02-21 RX ORDER — KETOROLAC TROMETHAMINE 30 MG/ML
30 INJECTION, SOLUTION INTRAMUSCULAR; INTRAVENOUS
Status: COMPLETED | OUTPATIENT
Start: 2025-02-21 | End: 2025-02-21

## 2025-02-21 RX ORDER — HYDROXYZINE PAMOATE 25 MG/1
25 CAPSULE ORAL
COMMUNITY
Start: 2024-10-25

## 2025-02-21 RX ADMIN — PROCHLORPERAZINE EDISYLATE 10 MG: 5 INJECTION INTRAMUSCULAR; INTRAVENOUS at 20:13

## 2025-02-21 RX ADMIN — KETOROLAC TROMETHAMINE 30 MG: 30 INJECTION, SOLUTION INTRAMUSCULAR; INTRAVENOUS at 20:12

## 2025-02-21 RX ADMIN — DEXAMETHASONE SODIUM PHOSPHATE 10 MG: 10 INJECTION, SOLUTION INTRAMUSCULAR; INTRAVENOUS at 20:10

## 2025-02-21 RX ADMIN — SODIUM CHLORIDE 500 ML: 900 INJECTION, SOLUTION INTRAVENOUS at 20:13

## 2025-02-21 RX ADMIN — POTASSIUM CHLORIDE 40 MEQ: 750 TABLET, FILM COATED, EXTENDED RELEASE ORAL at 21:06

## 2025-02-21 ASSESSMENT — PAIN SCALES - GENERAL: PAINLEVEL_OUTOF10: 10

## 2025-02-21 ASSESSMENT — LIFESTYLE VARIABLES
HOW MANY STANDARD DRINKS CONTAINING ALCOHOL DO YOU HAVE ON A TYPICAL DAY: PATIENT DOES NOT DRINK
HOW OFTEN DO YOU HAVE A DRINK CONTAINING ALCOHOL: NEVER

## 2025-02-21 ASSESSMENT — PAIN DESCRIPTION - LOCATION: LOCATION: HEAD

## 2025-02-21 ASSESSMENT — PAIN - FUNCTIONAL ASSESSMENT: PAIN_FUNCTIONAL_ASSESSMENT: 0-10

## 2025-02-21 ASSESSMENT — PAIN DESCRIPTION - DESCRIPTORS: DESCRIPTORS: ACHING;SORE

## 2025-02-22 NOTE — ED NOTES
Pt presents ambulatory to ED complaining of headache and blurry vision. Pt has a history of pre-eclampsia with previous pregnancy and had to deliver her child at 6 months. Pt reports taking valsartan and labetalol for HTN. Pt is alert and oriented x 4, RR even and unlabored, skin is warm and dry. Assesment completed and pt updated on plan of care.       Emergency Department Nursing Plan of Care       The Nursing Plan of Care is developed from the Nursing assessment and Emergency Department Attending provider initial evaluation.  The plan of care may be reviewed in the “ED Provider note”.    The Plan of Care was developed with the following considerations:   Patient / Family readiness to learn indicated by:verbalized understanding  Persons(s) to be included in education: patient  Barriers to Learning/Limitations:None    Signed     Haven Sanchez RN    2/21/2025   7:33 PM

## 2025-02-22 NOTE — ED NOTES
Discharge instructions were given to the patient by Haven FLORES.     The patient left the Emergency Department alert and oriented and in no acute distress with 0 prescriptions. The patient was encouraged to call or return to the ED for worsening issues or problems and was encouraged to schedule a follow up appointment for continuing care.     Ambulation assessment completed before discharge.  Pt left Emergency Department ambulating at baseline with no ortho devices  Ortho device education: none    The patient verbalized understanding of discharge instructions and prescriptions, all questions were answered. The patient has no further concerns at this time.

## 2025-02-22 NOTE — ED PROVIDER NOTES
[facility] EMERGENCY DEPARTMENT  EMERGENCY DEPARTMENT ENCOUNTER       Pt Name: Ace Rousseau  MRN: [id number]  Birthdate 1995  Date of evaluation: 2/21/2025  Provider: Lakeisha Bradshaw PA-C   PCP: Junior Oquendo MD  Note Started: 8:25 PM EST 2/21/25     CHIEF COMPLAINT       Chief Complaint   Patient presents with    Headache     Patient presents to ED with c/o headache.         HISTORY OF PRESENT ILLNESS: 1 or more elements      History From: Patient  HPI Limitations: None     Ace Rousseau is a 29 y.o. female who presents to the ED stating she was seen last week for GI symptoms but was not given any type of workup.  She states her diarrhea has resolved but she continues to have nausea and vomiting.  She also reports a dizziness with ambulation but lightheadedness at rest, headache, fatigue and blurry vision.  She does report a history of preeclampsia and is taking valsartan and labetalol.  She also reports a history of vertigo and states she feels that those symptoms are returning.  She states she is due for her nighttime dose of labetalol but with the nausea and vomiting could potentially be vomiting up her medication.  She reports a frontal headache which is different from her usual migraines which are posterior in nature.  She rates discomfort 10 out of 10.  She denies any chest pain, shortness of breath, weakness to the extremities but does report some paresthesias to the hands.     Nursing Notes were all reviewed and agreed with or any disagreements were addressed in the HPI.   Please see MDM for additional details of HPI and ROS  REVIEW OF SYSTEMS      Review of Systems     Positives and Pertinent negatives as per HPI.    PAST HISTORY     Past Medical History:  Past Medical History:   Diagnosis Date    Asthma     Depression     has been on meds since 1/11    Headache     Hypertension     Morbid obesity     Musculoskeletal disorder     Psychiatric disorder     Seasonal allergies

## 2025-02-24 LAB
EKG ATRIAL RATE: 78 BPM
EKG DIAGNOSIS: NORMAL
EKG P AXIS: 42 DEGREES
EKG P-R INTERVAL: 196 MS
EKG Q-T INTERVAL: 368 MS
EKG QRS DURATION: 92 MS
EKG QTC CALCULATION (BAZETT): 419 MS
EKG R AXIS: -1 DEGREES
EKG T AXIS: -26 DEGREES
EKG VENTRICULAR RATE: 78 BPM

## 2025-02-24 PROCEDURE — 93010 ELECTROCARDIOGRAM REPORT: CPT | Performed by: SPECIALIST

## 2025-05-19 ENCOUNTER — TELEPHONE (OUTPATIENT)
Age: 30
End: 2025-05-19

## 2025-05-19 NOTE — TELEPHONE ENCOUNTER
RE:[employer]      Case submitted via [facility] was approved 05/19/2025-11/14/2025.letter in media

## 2025-08-12 ENCOUNTER — HOSPITAL ENCOUNTER (EMERGENCY)
Facility: HOSPITAL | Age: 30
Discharge: HOME | End: 2025-08-12
Attending: EMERGENCY MEDICINE
Payer: MEDICAID

## 2025-08-12 VITALS
BODY MASS INDEX: 48.32 KG/M2 | WEIGHT: 283 LBS | HEIGHT: 64 IN | SYSTOLIC BLOOD PRESSURE: 156 MMHG | RESPIRATION RATE: 16 BRPM | TEMPERATURE: 98 F | DIASTOLIC BLOOD PRESSURE: 104 MMHG | HEART RATE: 88 BPM | OXYGEN SATURATION: 99 %

## 2025-08-12 DIAGNOSIS — N39.0 ACUTE UTI: ICD-10-CM

## 2025-08-12 DIAGNOSIS — I16.0 HYPERTENSIVE URGENCY: ICD-10-CM

## 2025-08-12 DIAGNOSIS — R10.2 ACUTE SUPRAPUBIC PAIN: Primary | ICD-10-CM

## 2025-08-12 DIAGNOSIS — R31.0 GROSS HEMATURIA: ICD-10-CM

## 2025-08-12 LAB
APPEARANCE UR: CLEAR
BACTERIA URNS QL MICRO: ABNORMAL /HPF
BILIRUB UR QL: NEGATIVE
COLOR UR: ABNORMAL
EPITH CASTS URNS QL MICRO: ABNORMAL /LPF
GLUCOSE UR STRIP.AUTO-MCNC: NEGATIVE MG/DL
HCG UR QL: NEGATIVE
HGB UR QL STRIP: ABNORMAL
KETONES UR QL STRIP.AUTO: NEGATIVE MG/DL
LEUKOCYTE ESTERASE UR QL STRIP.AUTO: ABNORMAL
NITRITE UR QL STRIP.AUTO: NEGATIVE
PH UR STRIP: 6 (ref 5–8)
PROT UR STRIP-MCNC: 100 MG/DL
RBC #/AREA URNS HPF: ABNORMAL /HPF (ref 0–5)
SP GR UR REFRACTOMETRY: 1.01
URINE CULTURE IF INDICATED: ABNORMAL
UROBILINOGEN UR QL STRIP.AUTO: 0.2 EU/DL (ref 0.2–1)
WBC URNS QL MICRO: ABNORMAL /HPF (ref 0–4)

## 2025-08-12 PROCEDURE — 87186 SC STD MICRODIL/AGAR DIL: CPT

## 2025-08-12 PROCEDURE — 81001 URINALYSIS AUTO W/SCOPE: CPT

## 2025-08-12 PROCEDURE — 87088 URINE BACTERIA CULTURE: CPT

## 2025-08-12 PROCEDURE — 87086 URINE CULTURE/COLONY COUNT: CPT

## 2025-08-12 PROCEDURE — 81025 URINE PREGNANCY TEST: CPT

## 2025-08-12 PROCEDURE — 99283 EMERGENCY DEPT VISIT LOW MDM: CPT

## 2025-08-12 PROCEDURE — 6370000000 HC RX 637 (ALT 250 FOR IP): Performed by: EMERGENCY MEDICINE

## 2025-08-12 RX ORDER — VALSARTAN 80 MG/1
160 TABLET ORAL ONCE
Status: DISCONTINUED | OUTPATIENT
Start: 2025-08-12 | End: 2025-08-12

## 2025-08-12 RX ORDER — PHENAZOPYRIDINE HYDROCHLORIDE 100 MG/1
100 TABLET, FILM COATED ORAL 3 TIMES DAILY PRN
Qty: 10 TABLET | Refills: 0 | Status: SHIPPED | OUTPATIENT
Start: 2025-08-12 | End: 2025-08-15

## 2025-08-12 RX ORDER — NITROFURANTOIN 25; 75 MG/1; MG/1
100 CAPSULE ORAL 2 TIMES DAILY
Qty: 14 CAPSULE | Refills: 0 | Status: SHIPPED | OUTPATIENT
Start: 2025-08-12 | End: 2025-08-19

## 2025-08-12 RX ORDER — FLUCONAZOLE 150 MG/1
150 TABLET ORAL ONCE
Qty: 1 TABLET | Refills: 1 | Status: SHIPPED | OUTPATIENT
Start: 2025-08-12 | End: 2025-08-12

## 2025-08-12 RX ORDER — CLONIDINE HYDROCHLORIDE 0.1 MG/1
0.2 TABLET ORAL
Status: COMPLETED | OUTPATIENT
Start: 2025-08-12 | End: 2025-08-12

## 2025-08-12 RX ORDER — NITROFURANTOIN 25; 75 MG/1; MG/1
100 CAPSULE ORAL ONCE
Status: COMPLETED | OUTPATIENT
Start: 2025-08-12 | End: 2025-08-12

## 2025-08-12 RX ORDER — FLUCONAZOLE 150 MG/1
150 TABLET ORAL ONCE
Qty: 1 TABLET | Refills: 0 | Status: SHIPPED | OUTPATIENT
Start: 2025-08-12 | End: 2025-08-12

## 2025-08-12 RX ADMIN — CLONIDINE HYDROCHLORIDE 0.2 MG: 0.1 TABLET ORAL at 23:21

## 2025-08-12 RX ADMIN — NITROFURANTOIN MONOHYDRATE/MACROCRYSTALLINE 100 MG: 25; 75 CAPSULE ORAL at 23:50

## 2025-08-12 ASSESSMENT — PAIN - FUNCTIONAL ASSESSMENT: PAIN_FUNCTIONAL_ASSESSMENT: 0-10

## 2025-08-12 ASSESSMENT — PAIN DESCRIPTION - PAIN TYPE: TYPE: ACUTE PAIN

## 2025-08-12 ASSESSMENT — PAIN DESCRIPTION - DESCRIPTORS: DESCRIPTORS: PRESSURE

## 2025-08-12 ASSESSMENT — PAIN DESCRIPTION - ORIENTATION: ORIENTATION: LOWER

## 2025-08-12 ASSESSMENT — PAIN DESCRIPTION - LOCATION: LOCATION: PELVIS

## 2025-08-12 ASSESSMENT — PAIN SCALES - GENERAL: PAINLEVEL_OUTOF10: 6

## 2025-08-14 ASSESSMENT — ENCOUNTER SYMPTOMS
NAUSEA: 0
DIARRHEA: 0
RHINORRHEA: 0
COUGH: 0
SORE THROAT: 0
EYE PAIN: 0
ABDOMINAL PAIN: 1
SHORTNESS OF BREATH: 0
VOMITING: 0

## 2025-08-15 LAB
BACTERIA SPEC CULT: ABNORMAL
CC UR VC: ABNORMAL
SERVICE CMNT-IMP: ABNORMAL

## 2025-08-24 ENCOUNTER — HOSPITAL ENCOUNTER (EMERGENCY)
Facility: HOSPITAL | Age: 30
Discharge: HOME | End: 2025-08-24
Payer: MEDICAID

## 2025-08-24 VITALS
TEMPERATURE: 97.7 F | OXYGEN SATURATION: 99 % | RESPIRATION RATE: 16 BRPM | HEART RATE: 84 BPM | SYSTOLIC BLOOD PRESSURE: 148 MMHG | DIASTOLIC BLOOD PRESSURE: 97 MMHG

## 2025-08-24 DIAGNOSIS — N30.01 ACUTE CYSTITIS WITH HEMATURIA: Primary | ICD-10-CM

## 2025-08-24 LAB
APPEARANCE UR: ABNORMAL
BACTERIA URNS QL MICRO: NEGATIVE /HPF
BILIRUB UR QL CFM: POSITIVE
COLOR UR: ABNORMAL
EPITH CASTS URNS QL MICRO: ABNORMAL /LPF
GLUCOSE UR STRIP.AUTO-MCNC: NEGATIVE MG/DL
HGB UR QL STRIP: ABNORMAL
KETONES UR QL STRIP.AUTO: NEGATIVE MG/DL
LEUKOCYTE ESTERASE UR QL STRIP.AUTO: ABNORMAL
NITRITE UR QL STRIP.AUTO: POSITIVE
PH UR STRIP: 5.5 (ref 5–8)
PROT UR STRIP-MCNC: 300 MG/DL
RBC #/AREA URNS HPF: ABNORMAL /HPF (ref 0–5)
SP GR UR REFRACTOMETRY: >1.03 (ref 1–1.03)
URINE CULTURE IF INDICATED: ABNORMAL
UROBILINOGEN UR QL STRIP.AUTO: 1 EU/DL (ref 0.2–1)
WBC URNS QL MICRO: ABNORMAL /HPF (ref 0–4)

## 2025-08-24 PROCEDURE — 99283 EMERGENCY DEPT VISIT LOW MDM: CPT

## 2025-08-24 PROCEDURE — 81001 URINALYSIS AUTO W/SCOPE: CPT

## 2025-08-24 PROCEDURE — 87086 URINE CULTURE/COLONY COUNT: CPT

## 2025-08-24 RX ORDER — CIPROFLOXACIN 500 MG/1
500 TABLET, FILM COATED ORAL 2 TIMES DAILY
Qty: 14 TABLET | Refills: 0 | Status: SHIPPED | OUTPATIENT
Start: 2025-08-24 | End: 2025-08-31

## 2025-08-24 RX ORDER — FLUCONAZOLE 150 MG/1
150 TABLET ORAL
Qty: 2 TABLET | Refills: 0 | Status: SHIPPED | OUTPATIENT
Start: 2025-08-24 | End: 2025-08-30

## 2025-08-24 ASSESSMENT — LIFESTYLE VARIABLES
HOW OFTEN DO YOU HAVE A DRINK CONTAINING ALCOHOL: MONTHLY OR LESS
HOW MANY STANDARD DRINKS CONTAINING ALCOHOL DO YOU HAVE ON A TYPICAL DAY: 1 OR 2

## 2025-08-24 ASSESSMENT — PAIN SCALES - GENERAL: PAINLEVEL_OUTOF10: 7

## 2025-08-26 LAB
BACTERIA SPEC CULT: NORMAL
SERVICE CMNT-IMP: NORMAL